# Patient Record
Sex: MALE | Race: WHITE | NOT HISPANIC OR LATINO
[De-identification: names, ages, dates, MRNs, and addresses within clinical notes are randomized per-mention and may not be internally consistent; named-entity substitution may affect disease eponyms.]

---

## 2017-02-15 ENCOUNTER — TRANSCRIPTION ENCOUNTER (OUTPATIENT)
Age: 52
End: 2017-02-15

## 2017-02-16 ENCOUNTER — APPOINTMENT (OUTPATIENT)
Dept: FAMILY MEDICINE | Facility: CLINIC | Age: 52
End: 2017-02-16

## 2017-02-16 VITALS
BODY MASS INDEX: 32.21 KG/M2 | DIASTOLIC BLOOD PRESSURE: 70 MMHG | HEIGHT: 70 IN | WEIGHT: 225 LBS | SYSTOLIC BLOOD PRESSURE: 110 MMHG | HEART RATE: 74 BPM

## 2017-02-16 DIAGNOSIS — F40.240 CLAUSTROPHOBIA: ICD-10-CM

## 2017-02-16 DIAGNOSIS — R10.9 UNSPECIFIED ABDOMINAL PAIN: ICD-10-CM

## 2017-03-16 ENCOUNTER — APPOINTMENT (OUTPATIENT)
Dept: FAMILY MEDICINE | Facility: CLINIC | Age: 52
End: 2017-03-16

## 2017-03-16 VITALS
TEMPERATURE: 98.5 F | HEART RATE: 99 BPM | BODY MASS INDEX: 32.21 KG/M2 | SYSTOLIC BLOOD PRESSURE: 110 MMHG | DIASTOLIC BLOOD PRESSURE: 78 MMHG | HEIGHT: 70 IN | WEIGHT: 225 LBS | OXYGEN SATURATION: 97 %

## 2017-07-10 ENCOUNTER — APPOINTMENT (OUTPATIENT)
Dept: FAMILY MEDICINE | Facility: CLINIC | Age: 52
End: 2017-07-10

## 2017-07-28 ENCOUNTER — APPOINTMENT (OUTPATIENT)
Dept: FAMILY MEDICINE | Facility: CLINIC | Age: 52
End: 2017-07-28
Payer: COMMERCIAL

## 2017-07-28 VITALS
BODY MASS INDEX: 32.21 KG/M2 | SYSTOLIC BLOOD PRESSURE: 112 MMHG | HEIGHT: 70 IN | WEIGHT: 225 LBS | TEMPERATURE: 98.6 F | DIASTOLIC BLOOD PRESSURE: 70 MMHG

## 2017-07-28 DIAGNOSIS — K05.10 CHRONIC GINGIVITIS, PLAQUE INDUCED: ICD-10-CM

## 2017-07-28 PROCEDURE — 99213 OFFICE O/P EST LOW 20 MIN: CPT

## 2017-07-29 PROBLEM — K05.10 GUM INFLAMMATION: Status: ACTIVE | Noted: 2017-07-29

## 2017-07-30 ENCOUNTER — TRANSCRIPTION ENCOUNTER (OUTPATIENT)
Age: 52
End: 2017-07-30

## 2017-10-20 ENCOUNTER — RX RENEWAL (OUTPATIENT)
Age: 52
End: 2017-10-20

## 2017-10-22 ENCOUNTER — TRANSCRIPTION ENCOUNTER (OUTPATIENT)
Age: 52
End: 2017-10-22

## 2017-10-24 ENCOUNTER — APPOINTMENT (OUTPATIENT)
Dept: FAMILY MEDICINE | Facility: CLINIC | Age: 52
End: 2017-10-24
Payer: COMMERCIAL

## 2017-10-24 VITALS
DIASTOLIC BLOOD PRESSURE: 80 MMHG | BODY MASS INDEX: 32.21 KG/M2 | TEMPERATURE: 98.7 F | WEIGHT: 225 LBS | HEIGHT: 70 IN | OXYGEN SATURATION: 97 % | SYSTOLIC BLOOD PRESSURE: 110 MMHG | HEART RATE: 119 BPM

## 2017-10-24 DIAGNOSIS — Z87.09 PERSONAL HISTORY OF OTHER DISEASES OF THE RESPIRATORY SYSTEM: ICD-10-CM

## 2017-10-24 PROCEDURE — 99214 OFFICE O/P EST MOD 30 MIN: CPT

## 2017-10-27 ENCOUNTER — APPOINTMENT (OUTPATIENT)
Dept: FAMILY MEDICINE | Facility: CLINIC | Age: 52
End: 2017-10-27
Payer: COMMERCIAL

## 2017-10-27 VITALS
DIASTOLIC BLOOD PRESSURE: 80 MMHG | TEMPERATURE: 98.5 F | BODY MASS INDEX: 32.21 KG/M2 | HEART RATE: 94 BPM | WEIGHT: 225 LBS | HEIGHT: 70 IN | SYSTOLIC BLOOD PRESSURE: 110 MMHG | OXYGEN SATURATION: 99 %

## 2017-10-27 DIAGNOSIS — Z87.09 PERSONAL HISTORY OF OTHER DISEASES OF THE RESPIRATORY SYSTEM: ICD-10-CM

## 2017-10-27 DIAGNOSIS — J31.0 CHRONIC RHINITIS: ICD-10-CM

## 2017-10-27 PROCEDURE — 99213 OFFICE O/P EST LOW 20 MIN: CPT

## 2017-10-27 RX ORDER — NYSTATIN 100000 [USP'U]/ML
100000 SUSPENSION ORAL
Qty: 480 | Refills: 0 | Status: DISCONTINUED | COMMUNITY
Start: 2017-07-30 | End: 2017-10-27

## 2017-10-27 RX ORDER — CLOTRIMAZOLE 10 MG/1
10 LOZENGE ORAL
Qty: 90 | Refills: 0 | Status: DISCONTINUED | COMMUNITY
Start: 2017-07-30 | End: 2017-10-27

## 2017-10-27 RX ORDER — AZITHROMYCIN 250 MG/1
250 TABLET, FILM COATED ORAL
Qty: 1 | Refills: 0 | Status: DISCONTINUED | COMMUNITY
Start: 2017-03-16 | End: 2017-10-27

## 2017-10-27 RX ORDER — SULFAMETHOXAZOLE AND TRIMETHOPRIM 800; 160 MG/1; MG/1
800-160 TABLET ORAL
Qty: 20 | Refills: 0 | Status: DISCONTINUED | COMMUNITY
Start: 2017-04-27 | End: 2017-10-27

## 2017-10-27 RX ORDER — METHYLPREDNISOLONE 4 MG/1
4 TABLET ORAL
Qty: 21 | Refills: 0 | Status: DISCONTINUED | COMMUNITY
Start: 2017-09-22 | End: 2017-10-27

## 2018-05-21 ENCOUNTER — APPOINTMENT (OUTPATIENT)
Dept: FAMILY MEDICINE | Facility: CLINIC | Age: 53
End: 2018-05-21
Payer: COMMERCIAL

## 2018-05-21 VITALS
BODY MASS INDEX: 31.55 KG/M2 | SYSTOLIC BLOOD PRESSURE: 128 MMHG | OXYGEN SATURATION: 98 % | HEIGHT: 70 IN | DIASTOLIC BLOOD PRESSURE: 82 MMHG | HEART RATE: 86 BPM | WEIGHT: 220.38 LBS

## 2018-05-21 DIAGNOSIS — M79.89 OTHER SPECIFIED SOFT TISSUE DISORDERS: ICD-10-CM

## 2018-05-21 DIAGNOSIS — Z87.2 PERSONAL HISTORY OF DISEASES OF THE SKIN AND SUBCUTANEOUS TISSUE: ICD-10-CM

## 2018-05-21 DIAGNOSIS — I83.90 ASYMPTOMATIC VARICOSE VEINS OF UNSPECIFIED LOWER EXTREMITY: ICD-10-CM

## 2018-05-21 DIAGNOSIS — I80.3 PHLEBITIS AND THROMBOPHLEBITIS OF LOWER EXTREMITIES, UNSPECIFIED: ICD-10-CM

## 2018-05-21 PROCEDURE — 99214 OFFICE O/P EST MOD 30 MIN: CPT

## 2018-05-21 RX ORDER — AZITHROMYCIN 250 MG/1
250 TABLET, FILM COATED ORAL
Qty: 1 | Refills: 0 | Status: COMPLETED | COMMUNITY
Start: 2017-10-24 | End: 2018-05-21

## 2018-05-21 RX ORDER — BENZONATATE 200 MG/1
200 CAPSULE ORAL
Qty: 30 | Refills: 0 | Status: COMPLETED | COMMUNITY
Start: 2017-10-27 | End: 2018-05-21

## 2018-05-21 RX ORDER — OMEPRAZOLE 40 MG/1
40 CAPSULE, DELAYED RELEASE ORAL
Qty: 30 | Refills: 0 | Status: COMPLETED | COMMUNITY
Start: 2017-02-16 | End: 2018-05-21

## 2018-05-21 RX ORDER — ALBUTEROL SULFATE 108 UG/1
108 (90 BASE) AEROSOL, METERED RESPIRATORY (INHALATION) EVERY 6 HOURS
Qty: 1 | Refills: 0 | Status: COMPLETED | COMMUNITY
Start: 2017-03-16 | End: 2018-05-21

## 2018-05-23 NOTE — ASSESSMENT
[FreeTextEntry1] : CAD,htn- cont meds. f/u cardio. check labs\par fatigue - check labs\par varicose veins, hx phlebitis - referral vascular as pt is considering vein ablation\par Screening labs ordered\par referral to derm for skin CA screen

## 2018-05-23 NOTE — HISTORY OF PRESENT ILLNESS
[de-identified] : Pt f/u CAD and htn. pt compliant w/ meds does not need refills at this time. Pt due for bloodwork.\par Pt needs referral to GI for f/u colonoscopy. Pt had polyps on last colonoscopy.\par Pt wants derm referral for skin check. Pt has not had screening labs done in a while. \par Pt has chronic fatigue issues.\par Pt has hx of phlebitis and varicose veins in leg, would like f/u with vascular.\par

## 2018-09-09 LAB
25(OH)D3 SERPL-MCNC: 28.7 NG/ML
ALBUMIN SERPL ELPH-MCNC: 4.2 G/DL
ALP BLD-CCNC: 60 U/L
ALT SERPL-CCNC: 28 U/L
ANION GAP SERPL CALC-SCNC: 14 MMOL/L
APPEARANCE: CLEAR
AST SERPL-CCNC: 29 U/L
BACTERIA: NEGATIVE
BASOPHILS # BLD AUTO: 0.06 K/UL
BASOPHILS NFR BLD AUTO: 1.1 %
BILIRUB SERPL-MCNC: 0.7 MG/DL
BILIRUBIN URINE: NEGATIVE
BLOOD URINE: NEGATIVE
BUN SERPL-MCNC: 10 MG/DL
CALCIUM SERPL-MCNC: 9.3 MG/DL
CHLORIDE SERPL-SCNC: 101 MMOL/L
CHOLEST SERPL-MCNC: 150 MG/DL
CHOLEST/HDLC SERPL: 3.1 RATIO
CO2 SERPL-SCNC: 26 MMOL/L
COLOR: YELLOW
CREAT SERPL-MCNC: 0.93 MG/DL
EOSINOPHIL # BLD AUTO: 0.36 K/UL
EOSINOPHIL NFR BLD AUTO: 6.6 %
GLUCOSE QUALITATIVE U: NEGATIVE MG/DL
GLUCOSE SERPL-MCNC: 81 MG/DL
HBA1C MFR BLD HPLC: 5.3 %
HCT VFR BLD CALC: 48.6 %
HDLC SERPL-MCNC: 49 MG/DL
HGB BLD-MCNC: 16 G/DL
IMM GRANULOCYTES NFR BLD AUTO: 0.2 %
KETONES URINE: NEGATIVE
LDLC SERPL CALC-MCNC: 93 MG/DL
LEUKOCYTE ESTERASE URINE: NEGATIVE
LYMPHOCYTES # BLD AUTO: 1.5 K/UL
LYMPHOCYTES NFR BLD AUTO: 27.7 %
MAN DIFF?: NORMAL
MCHC RBC-ENTMCNC: 31.6 PG
MCHC RBC-ENTMCNC: 32.9 GM/DL
MCV RBC AUTO: 96 FL
MICROSCOPIC-UA: NORMAL
MONOCYTES # BLD AUTO: 0.54 K/UL
MONOCYTES NFR BLD AUTO: 10 %
NEUTROPHILS # BLD AUTO: 2.95 K/UL
NEUTROPHILS NFR BLD AUTO: 54.4 %
NITRITE URINE: NEGATIVE
PH URINE: 7.5
PLATELET # BLD AUTO: 314 K/UL
POTASSIUM SERPL-SCNC: 3.8 MMOL/L
PROT SERPL-MCNC: 7.1 G/DL
PROTEIN URINE: ABNORMAL MG/DL
PSA SERPL-MCNC: 1.68 NG/ML
RBC # BLD: 5.06 M/UL
RBC # FLD: 13.4 %
RED BLOOD CELLS URINE: 2 /HPF
SODIUM SERPL-SCNC: 141 MMOL/L
SPECIFIC GRAVITY URINE: 1.02
SQUAMOUS EPITHELIAL CELLS: 0 /HPF
T4 FREE SERPL-MCNC: 1.2 NG/DL
TESTOST SERPL-MCNC: 485.1 NG/DL
TRIGL SERPL-MCNC: 42 MG/DL
TSH SERPL-ACNC: 1.88 UIU/ML
UROBILINOGEN URINE: NEGATIVE MG/DL
WBC # FLD AUTO: 5.42 K/UL
WHITE BLOOD CELLS URINE: 0 /HPF

## 2018-10-29 ENCOUNTER — RX RENEWAL (OUTPATIENT)
Age: 53
End: 2018-10-29

## 2018-11-06 ENCOUNTER — APPOINTMENT (OUTPATIENT)
Dept: FAMILY MEDICINE | Facility: CLINIC | Age: 53
End: 2018-11-06
Payer: COMMERCIAL

## 2018-11-06 VITALS
HEART RATE: 70 BPM | SYSTOLIC BLOOD PRESSURE: 120 MMHG | DIASTOLIC BLOOD PRESSURE: 70 MMHG | RESPIRATION RATE: 16 BRPM | WEIGHT: 238 LBS | OXYGEN SATURATION: 98 % | HEIGHT: 70 IN | BODY MASS INDEX: 34.07 KG/M2

## 2018-11-06 PROCEDURE — G0008: CPT

## 2018-11-06 PROCEDURE — 99214 OFFICE O/P EST MOD 30 MIN: CPT | Mod: 25

## 2018-11-06 PROCEDURE — 90686 IIV4 VACC NO PRSV 0.5 ML IM: CPT

## 2018-11-06 RX ORDER — BUDESONIDE AND FORMOTEROL FUMARATE DIHYDRATE 80; 4.5 UG/1; UG/1
80-4.5 AEROSOL RESPIRATORY (INHALATION) TWICE DAILY
Qty: 1 | Refills: 6 | Status: DISCONTINUED | COMMUNITY
Start: 2017-10-27 | End: 2018-11-06

## 2018-11-06 RX ORDER — DIAZEPAM 10 MG/1
10 TABLET ORAL
Qty: 10 | Refills: 0 | Status: DISCONTINUED | COMMUNITY
Start: 2017-09-18 | End: 2018-11-06

## 2018-11-06 RX ORDER — TRAMADOL HYDROCHLORIDE 50 MG/1
50 TABLET, COATED ORAL
Qty: 60 | Refills: 0 | Status: DISCONTINUED | COMMUNITY
Start: 2017-09-26 | End: 2018-11-06

## 2018-11-06 RX ORDER — AMOXICILLIN AND CLAVULANATE POTASSIUM 875; 125 MG/1; MG/1
875-125 TABLET, COATED ORAL
Qty: 20 | Refills: 0 | Status: COMPLETED | COMMUNITY
Start: 2017-10-22 | End: 2018-11-06

## 2018-11-06 NOTE — ASSESSMENT
[FreeTextEntry1] : htn- controlled, refill hctz, lisinopril\par obesity - Advised lifestyle modifications for wt loss. Healthy diet and regular exercise regimen discussed w/ pt. cont weight watchers\par insomnia - start melatonin, avoid ETOH at night. if no improvement pt may need medication\par Risks and benefits of flu vaccine discussed w/ pt. Consent given by pt, flu vaccine administered. Pt tolerated well

## 2018-11-06 NOTE — HISTORY OF PRESENT ILLNESS
[de-identified] : Pt for f/u htn, insomnia, obesity. Pt has restarted weight watchers. Pt has poor sleep nightly. Pt has DARREN, uses CPAP nightly. Pt only able to stay asleep approx 4-5 hrs. Pt sometimes uses advil PM which helps slightly. Pt does state he drinks 1 ETOH drink a night.

## 2019-01-05 ENCOUNTER — APPOINTMENT (OUTPATIENT)
Dept: FAMILY MEDICINE | Facility: CLINIC | Age: 54
End: 2019-01-05
Payer: COMMERCIAL

## 2019-01-05 VITALS
SYSTOLIC BLOOD PRESSURE: 114 MMHG | TEMPERATURE: 98.1 F | WEIGHT: 220 LBS | DIASTOLIC BLOOD PRESSURE: 80 MMHG | BODY MASS INDEX: 31.5 KG/M2 | OXYGEN SATURATION: 97 % | HEIGHT: 70 IN | HEART RATE: 73 BPM

## 2019-01-05 DIAGNOSIS — R10.30 LOWER ABDOMINAL PAIN, UNSPECIFIED: ICD-10-CM

## 2019-01-05 LAB
BILIRUB UR QL STRIP: NEGATIVE
CLARITY UR: CLEAR
COLLECTION METHOD: NORMAL
GLUCOSE UR-MCNC: NEGATIVE
HCG UR QL: 0.2 EU/DL
HGB UR QL STRIP.AUTO: NEGATIVE
KETONES UR-MCNC: NORMAL
LEUKOCYTE ESTERASE UR QL STRIP: NEGATIVE
NITRITE UR QL STRIP: NEGATIVE
PH UR STRIP: 5.5
PROT UR STRIP-MCNC: NEGATIVE
SP GR UR STRIP: 1.02

## 2019-01-05 PROCEDURE — 99213 OFFICE O/P EST LOW 20 MIN: CPT | Mod: 25

## 2019-01-05 PROCEDURE — 81003 URINALYSIS AUTO W/O SCOPE: CPT | Mod: QW

## 2019-01-05 NOTE — ASSESSMENT
[FreeTextEntry1] : lower abdominal pain - check urine cx. advised wearing looser pants. if pain worsens then may order abd ct

## 2019-01-05 NOTE — HISTORY OF PRESENT ILLNESS
[FreeTextEntry8] : Pt c/o lower back spasms chronically. Lately pain has radiated from R flank into lower abdomen and groin. However, when pt urinates in AM pain decreases significantly. Denies dysuria, fever, chills.

## 2019-01-08 LAB — BACTERIA UR CULT: NORMAL

## 2019-02-01 ENCOUNTER — RX RENEWAL (OUTPATIENT)
Age: 54
End: 2019-02-01

## 2019-03-11 ENCOUNTER — RESULT REVIEW (OUTPATIENT)
Age: 54
End: 2019-03-11

## 2019-09-20 ENCOUNTER — TRANSCRIPTION ENCOUNTER (OUTPATIENT)
Age: 54
End: 2019-09-20

## 2019-11-19 ENCOUNTER — RX RENEWAL (OUTPATIENT)
Age: 54
End: 2019-11-19

## 2019-11-22 ENCOUNTER — RX RENEWAL (OUTPATIENT)
Age: 54
End: 2019-11-22

## 2019-12-03 ENCOUNTER — APPOINTMENT (OUTPATIENT)
Dept: FAMILY MEDICINE | Facility: CLINIC | Age: 54
End: 2019-12-03
Payer: COMMERCIAL

## 2019-12-03 ENCOUNTER — LABORATORY RESULT (OUTPATIENT)
Age: 54
End: 2019-12-03

## 2019-12-03 VITALS
HEART RATE: 84 BPM | HEIGHT: 70 IN | WEIGHT: 225 LBS | DIASTOLIC BLOOD PRESSURE: 90 MMHG | BODY MASS INDEX: 32.21 KG/M2 | RESPIRATION RATE: 16 BRPM | SYSTOLIC BLOOD PRESSURE: 118 MMHG | OXYGEN SATURATION: 95 %

## 2019-12-03 VITALS — DIASTOLIC BLOOD PRESSURE: 86 MMHG | SYSTOLIC BLOOD PRESSURE: 126 MMHG

## 2019-12-03 DIAGNOSIS — I25.10 ATHEROSCLEROTIC HEART DISEASE OF NATIVE CORONARY ARTERY W/OUT ANGINA PECTORIS: ICD-10-CM

## 2019-12-03 DIAGNOSIS — Z91.018 ALLERGY TO OTHER FOODS: ICD-10-CM

## 2019-12-03 DIAGNOSIS — J98.01 ACUTE BRONCHOSPASM: ICD-10-CM

## 2019-12-03 PROCEDURE — 90686 IIV4 VACC NO PRSV 0.5 ML IM: CPT

## 2019-12-03 PROCEDURE — 99214 OFFICE O/P EST MOD 30 MIN: CPT | Mod: 25

## 2019-12-03 PROCEDURE — G0008: CPT

## 2019-12-03 NOTE — HISTORY OF PRESENT ILLNESS
[de-identified] : Pt for f/u htn, insomnia, obesity. Pt has inconsistent diet. Pt has poor sleep nightly. Pt has DARREN, uses CPAP nightly. Pt only able to stay asleep approx 4-5 hrs. Pt sometimes uses advil PM which helps slightly. Pt does state he drinks 1 ETOH drink a night.\par Pt was found to have elevated , pt not c/o myalgias. Pt does heavy physical work daily as a .

## 2019-12-03 NOTE — ASSESSMENT
[FreeTextEntry1] : CAD, htn - refill lisinopril, hctz. check cmp\par elevated CK- recheck CK level\par obesity - Advised lifestyle modifications for wt loss. Healthy diet and regular exercise regimen discussed w/ pt. check hba1c\par possibly food allergy- check food allergy panel\par bronchospasm - refill ventolin prn

## 2019-12-09 LAB
ALBUMIN SERPL ELPH-MCNC: 4.4 G/DL
ALP BLD-CCNC: 63 U/L
ALT SERPL-CCNC: 29 U/L
ANION GAP SERPL CALC-SCNC: 13 MMOL/L
AST SERPL-CCNC: 25 U/L
BARLEY IGE QN: <0.1 KUA/L
BILIRUB SERPL-MCNC: 0.6 MG/DL
BUN SERPL-MCNC: 12 MG/DL
CALCIUM SERPL-MCNC: 9.7 MG/DL
CHERRY IGE QN: <0.1 KUA/L
CHLORIDE SERPL-SCNC: 102 MMOL/L
CK SERPL-CCNC: 417 U/L
CO2 SERPL-SCNC: 25 MMOL/L
COW MILK IGE QN: <0.1 KUA/L
CRAB IGE QN: <0.1 KUA/L
CREAT SERPL-MCNC: 0.94 MG/DL
DEPRECATED BARLEY IGE RAST QL: 0
DEPRECATED CHERRY IGE RAST QL: 0
DEPRECATED COW MILK IGE RAST QL: 0
DEPRECATED CRAB IGE RAST QL: 0
DEPRECATED EGG WHITE IGE RAST QL: 0
DEPRECATED OAT IGE RAST QL: 0
DEPRECATED PEANUT IGE RAST QL: 0
DEPRECATED RYE IGE RAST QL: 0
DEPRECATED SOYBEAN IGE RAST QL: 0
DEPRECATED WHEAT IGE RAST QL: 0
EGG WHITE IGE QN: <0.1 KUA/L
ESTIMATED AVERAGE GLUCOSE: 105 MG/DL
GLUCOSE SERPL-MCNC: 93 MG/DL
HBA1C MFR BLD HPLC: 5.3 %
OAT IGE QN: <0.1 KUA/L
PEANUT IGE QN: <0.1 KUA/L
POTASSIUM SERPL-SCNC: 4.1 MMOL/L
PROT SERPL-MCNC: 6.8 G/DL
RYE IGE QN: <0.1 KUA/L
SODIUM SERPL-SCNC: 140 MMOL/L
SOYBEAN IGE QN: <0.1 KUA/L
TOTAL IGE SMQN RAST: 77 KU/L
WHEAT IGE QN: <0.1 KUA/L

## 2020-05-05 ENCOUNTER — APPOINTMENT (OUTPATIENT)
Dept: FAMILY MEDICINE | Facility: CLINIC | Age: 55
End: 2020-05-05
Payer: OTHER MISCELLANEOUS

## 2020-05-05 VITALS
SYSTOLIC BLOOD PRESSURE: 120 MMHG | DIASTOLIC BLOOD PRESSURE: 72 MMHG | WEIGHT: 209 LBS | OXYGEN SATURATION: 98 % | RESPIRATION RATE: 16 BRPM | HEART RATE: 82 BPM | BODY MASS INDEX: 29.92 KG/M2 | HEIGHT: 70 IN

## 2020-05-05 DIAGNOSIS — K42.9 UMBILICAL HERNIA W/OUT OBSTRUCTION OR GANGRENE: ICD-10-CM

## 2020-05-05 PROCEDURE — 99203 OFFICE O/P NEW LOW 30 MIN: CPT

## 2020-05-05 NOTE — PHYSICAL EXAM
[Normal] : normal rate, regular rhythm, normal S1 and S2 and no murmur heard [Soft] : abdomen soft [Non Tender] : non-tender [Non-distended] : non-distended [No HSM] : no HSM [Normal Bowel Sounds] : normal bowel sounds [de-identified] : +umbilical hernia, reducible

## 2020-05-05 NOTE — ASSESSMENT
[FreeTextEntry1] : umbilical hernia - pt may continue to work but should wear abdominal band to help dec intraabdominal pressure. Pt should limit any lifting >30 lbs. Referred pt to gen surg for repair

## 2020-05-05 NOTE — HISTORY OF PRESENT ILLNESS
[FreeTextEntry8] : Pt in office for new w/c case. DOI 4/27/20. Pt was lifting a water heater at work and felt sharp pain in his abdomen. Pt then noticed he had outpouching around his umbilicus. Pt then saw more outpouching and felt pain when lifting any heavy objects.

## 2020-05-07 ENCOUNTER — TRANSCRIPTION ENCOUNTER (OUTPATIENT)
Age: 55
End: 2020-05-07

## 2020-09-17 ENCOUNTER — LABORATORY RESULT (OUTPATIENT)
Age: 55
End: 2020-09-17

## 2020-09-17 ENCOUNTER — APPOINTMENT (OUTPATIENT)
Dept: FAMILY MEDICINE | Facility: CLINIC | Age: 55
End: 2020-09-17
Payer: COMMERCIAL

## 2020-09-17 VITALS
HEIGHT: 70 IN | WEIGHT: 216 LBS | HEART RATE: 89 BPM | OXYGEN SATURATION: 98 % | SYSTOLIC BLOOD PRESSURE: 124 MMHG | DIASTOLIC BLOOD PRESSURE: 80 MMHG | BODY MASS INDEX: 30.92 KG/M2

## 2020-09-17 DIAGNOSIS — L30.9 DERMATITIS, UNSPECIFIED: ICD-10-CM

## 2020-09-17 PROCEDURE — 36415 COLL VENOUS BLD VENIPUNCTURE: CPT

## 2020-09-17 PROCEDURE — 99214 OFFICE O/P EST MOD 30 MIN: CPT | Mod: 25

## 2020-09-19 NOTE — HISTORY OF PRESENT ILLNESS
[FreeTextEntry8] : Pt c/o small rash that pops up on arms and lower legs. These lesions usually pop up 1-2 x a year. Most recently pt has had these occur daily. Topical steroids have not helped. Pt unsure if these are triggered by certain foods. Denies new lotions/soaps.\par Pt also f/u obesity, htn, elev CK. Pt due tor echeck labs.

## 2020-09-21 DIAGNOSIS — Z86.2 PERSONAL HISTORY OF DISEASES OF THE BLOOD AND BLOOD-FORMING ORGANS AND CERTAIN DISORDERS INVOLVING THE IMMUNE MECHANISM: ICD-10-CM

## 2020-09-21 LAB
ALBUMIN SERPL ELPH-MCNC: 4.8 G/DL
ALP BLD-CCNC: 62 U/L
ALT SERPL-CCNC: 26 U/L
ANION GAP SERPL CALC-SCNC: 14 MMOL/L
APPLE IGE QN: <0.1 KUA/L
AST SERPL-CCNC: 24 U/L
BANANA IGE QN: 0.17 KUA/L
BARLEY IGE QN: <0.1 KUA/L
BASOPHILS # BLD AUTO: 0.1 K/UL
BASOPHILS NFR BLD AUTO: 1.4 %
BILIRUB SERPL-MCNC: 0.3 MG/DL
BUN SERPL-MCNC: 16 MG/DL
CALCIUM SERPL-MCNC: 9.5 MG/DL
CHERRY IGE QN: <0.1 KUA/L
CHLORIDE SERPL-SCNC: 102 MMOL/L
CO2 SERPL-SCNC: 26 MMOL/L
COCONUT IGE QN: 0
COCONUT IGE QN: <0.1 KUA/L
COW MILK IGE QN: <0.1 KUA/L
CRAB IGE QN: <0.1 KUA/L
CREAT SERPL-MCNC: 1.08 MG/DL
DEPRECATED APPLE IGE RAST QL: 0
DEPRECATED BANANA IGE RAST QL: NORMAL
DEPRECATED BARLEY IGE RAST QL: 0
DEPRECATED CHERRY IGE RAST QL: 0
DEPRECATED COW MILK IGE RAST QL: 0
DEPRECATED CRAB IGE RAST QL: 0
DEPRECATED EGG WHITE IGE RAST QL: 0
DEPRECATED KIWIFRUIT IGE RAST QL: <0.1 KUA/L
DEPRECATED MELON IGE RAST QL: 0
DEPRECATED OAT IGE RAST QL: 0
DEPRECATED ORANGE IGE RAST QL: 0
DEPRECATED PEANUT IGE RAST QL: 0
DEPRECATED RYE IGE RAST QL: 0
DEPRECATED SOYBEAN IGE RAST QL: 0
DEPRECATED STRAWBERRY IGE RAST QL: 0
DEPRECATED WHEAT IGE RAST QL: 0
EGG WHITE IGE QN: <0.1 KUA/L
EOSINOPHIL # BLD AUTO: 0.54 K/UL
EOSINOPHIL NFR BLD AUTO: 7.8 %
ESTIMATED AVERAGE GLUCOSE: 103 MG/DL
GLUCOSE SERPL-MCNC: 94 MG/DL
HBA1C MFR BLD HPLC: 5.2 %
HCT VFR BLD CALC: 48.2 %
HGB BLD-MCNC: 16 G/DL
IMM GRANULOCYTES NFR BLD AUTO: 0.1 %
KIWIFRUIT IGE QN: 0
LYMPHOCYTES # BLD AUTO: 2.09 K/UL
LYMPHOCYTES NFR BLD AUTO: 30.2 %
MAN DIFF?: NORMAL
MCHC RBC-ENTMCNC: 31.6 PG
MCHC RBC-ENTMCNC: 33.2 GM/DL
MCV RBC AUTO: 95.1 FL
MELON IGE QN: <0.1 KUA/L
MONOCYTES # BLD AUTO: 0.59 K/UL
MONOCYTES NFR BLD AUTO: 8.5 %
NEUTROPHILS # BLD AUTO: 3.58 K/UL
NEUTROPHILS NFR BLD AUTO: 52 %
OAT IGE QN: <0.1 KUA/L
ORANGE IGE QN: <0.1 KUA/L
PEANUT IGE QN: <0.1 KUA/L
PLATELET # BLD AUTO: 341 K/UL
POTASSIUM SERPL-SCNC: 4.5 MMOL/L
PROT SERPL-MCNC: 6.9 G/DL
PSA SERPL-MCNC: 1.76 NG/ML
RBC # BLD: 5.07 M/UL
RBC # FLD: 12.8 %
RYE IGE QN: <0.1 KUA/L
SODIUM SERPL-SCNC: 142 MMOL/L
SOYBEAN IGE QN: <0.1 KUA/L
STRAWBERRY IGE QN: <0.1 KUA/L
T4 FREE SERPL-MCNC: 1.2 NG/DL
TOTAL IGE SMQN RAST: 65 KU/L
TSH SERPL-ACNC: 1.38 UIU/ML
WBC # FLD AUTO: 6.91 K/UL
WHEAT IGE QN: <0.1 KUA/L

## 2020-12-15 PROBLEM — Z87.09 HISTORY OF ACUTE SINUSITIS: Status: RESOLVED | Noted: 2017-10-24 | Resolved: 2020-12-15

## 2020-12-15 PROBLEM — Z87.09 HISTORY OF ACUTE BRONCHITIS: Status: RESOLVED | Noted: 2017-03-16 | Resolved: 2020-12-15

## 2021-01-27 DIAGNOSIS — B07.0 PLANTAR WART: ICD-10-CM

## 2021-03-20 ENCOUNTER — TRANSCRIPTION ENCOUNTER (OUTPATIENT)
Age: 56
End: 2021-03-20

## 2021-05-20 ENCOUNTER — APPOINTMENT (OUTPATIENT)
Dept: FAMILY MEDICINE | Facility: CLINIC | Age: 56
End: 2021-05-20
Payer: COMMERCIAL

## 2021-05-20 VITALS
HEART RATE: 73 BPM | DIASTOLIC BLOOD PRESSURE: 80 MMHG | SYSTOLIC BLOOD PRESSURE: 110 MMHG | BODY MASS INDEX: 32.21 KG/M2 | OXYGEN SATURATION: 96 % | WEIGHT: 225 LBS | TEMPERATURE: 97.1 F | HEIGHT: 70 IN

## 2021-05-20 DIAGNOSIS — G89.29 LOW BACK PAIN: ICD-10-CM

## 2021-05-20 DIAGNOSIS — M54.5 LOW BACK PAIN: ICD-10-CM

## 2021-05-20 DIAGNOSIS — M25.561 PAIN IN RIGHT KNEE: ICD-10-CM

## 2021-05-20 DIAGNOSIS — Z71.89 OTHER SPECIFIED COUNSELING: ICD-10-CM

## 2021-05-20 DIAGNOSIS — R53.83 OTHER FATIGUE: ICD-10-CM

## 2021-05-20 DIAGNOSIS — M25.562 PAIN IN RIGHT KNEE: ICD-10-CM

## 2021-05-20 PROCEDURE — 99215 OFFICE O/P EST HI 40 MIN: CPT | Mod: 25

## 2021-05-20 PROCEDURE — 96127 BRIEF EMOTIONAL/BEHAV ASSMT: CPT

## 2021-05-21 PROBLEM — Z71.89 VACCINE COUNSELING: Status: ACTIVE | Noted: 2021-05-21

## 2021-05-21 NOTE — HISTORY OF PRESENT ILLNESS
[de-identified] : Pt to have signficiant environmental allergies. Pt seeing allergist. Pt using nasocort and allegra.\par Pt s/p COVID infection in 03/2021. Pt had antibody infusion 3/8/21. Pt had initially residual dyspnea which has resolved after 1 month. Pt has questions regarding when he should get vaccine.\par  \par Pt has pain that radiates down R leg when sitting for extended periods when driving. Pt also feels vibratory sensation down L thigh. Pt c/o occasional burning sensation on bottom of b/l feet R>L, last episode 1 month ago. \par Pt has chronic L knee pain for several months. Pt does kneel often for his work\par Pt also c/o chronic fatigue. Pt admits to sleeping <6 hrs of sleep/night.\par Pt also f/u htn, due to refill meds. Denies CP, palpitations, dyspnea, n/v

## 2021-05-21 NOTE — ASSESSMENT
[FreeTextEntry1] : chronic low back pain, b/l knee pain - nsaids prn. modify activity if possible\par fatigue - Check labs. advised increase in sleep as <6 hrs/night is likely contributing to chronic fatigue. avoid caffeine in PM hrs\par htn- reifll meds\par Check labs\par vaccine counseling - advised pt he may get COVID vaccine 3 mos after administration of monoclonal antibodies

## 2021-05-21 NOTE — PHYSICAL EXAM
[Normal] : normal rate, regular rhythm, normal S1 and S2 and no murmur heard [Soft] : abdomen soft [Non Tender] : non-tender [de-identified] : b/l knee no joint space tenderness, neg smith testing

## 2021-05-27 LAB
ALBUMIN SERPL ELPH-MCNC: 4.7 G/DL
ALP BLD-CCNC: 66 U/L
ALT SERPL-CCNC: 32 U/L
ANION GAP SERPL CALC-SCNC: 13 MMOL/L
APPEARANCE: CLEAR
AST SERPL-CCNC: 30 U/L
BACTERIA: NEGATIVE
BASOPHILS # BLD AUTO: 0.08 K/UL
BASOPHILS NFR BLD AUTO: 1.3 %
BILIRUB SERPL-MCNC: 0.6 MG/DL
BILIRUBIN URINE: NEGATIVE
BLOOD URINE: NEGATIVE
BUN SERPL-MCNC: 13 MG/DL
CALCIUM SERPL-MCNC: 9.7 MG/DL
CHLORIDE SERPL-SCNC: 102 MMOL/L
CHOLEST SERPL-MCNC: 163 MG/DL
CO2 SERPL-SCNC: 25 MMOL/L
COLOR: YELLOW
CREAT SERPL-MCNC: 0.92 MG/DL
EOSINOPHIL # BLD AUTO: 0.48 K/UL
EOSINOPHIL NFR BLD AUTO: 7.6 %
ESTIMATED AVERAGE GLUCOSE: 105 MG/DL
FOLATE SERPL-MCNC: 18.1 NG/ML
GLUCOSE QUALITATIVE U: NEGATIVE
GLUCOSE SERPL-MCNC: 89 MG/DL
HBA1C MFR BLD HPLC: 5.3 %
HCT VFR BLD CALC: 52.1 %
HDLC SERPL-MCNC: 50 MG/DL
HGB BLD-MCNC: 17.2 G/DL
HYALINE CASTS: 0 /LPF
IMM GRANULOCYTES NFR BLD AUTO: 0.3 %
KETONES URINE: NEGATIVE
LDLC SERPL CALC-MCNC: 101 MG/DL
LEUKOCYTE ESTERASE URINE: NEGATIVE
LYMPHOCYTES # BLD AUTO: 1.79 K/UL
LYMPHOCYTES NFR BLD AUTO: 28.3 %
MAN DIFF?: NORMAL
MCHC RBC-ENTMCNC: 31.3 PG
MCHC RBC-ENTMCNC: 33 GM/DL
MCV RBC AUTO: 94.9 FL
MICROSCOPIC-UA: NORMAL
MONOCYTES # BLD AUTO: 0.78 K/UL
MONOCYTES NFR BLD AUTO: 12.3 %
NEUTROPHILS # BLD AUTO: 3.18 K/UL
NEUTROPHILS NFR BLD AUTO: 50.2 %
NITRITE URINE: NEGATIVE
NONHDLC SERPL-MCNC: 113 MG/DL
PH URINE: 7
PLATELET # BLD AUTO: 334 K/UL
POTASSIUM SERPL-SCNC: 4.4 MMOL/L
PROT SERPL-MCNC: 7 G/DL
PROTEIN URINE: NORMAL
PSA SERPL-MCNC: 1.87 NG/ML
RBC # BLD: 5.49 M/UL
RBC # FLD: 12.8 %
RED BLOOD CELLS URINE: 1 /HPF
SODIUM SERPL-SCNC: 140 MMOL/L
SPECIFIC GRAVITY URINE: 1.03
SQUAMOUS EPITHELIAL CELLS: 0 /HPF
T4 FREE SERPL-MCNC: 1.3 NG/DL
TRIGL SERPL-MCNC: 60 MG/DL
TSH SERPL-ACNC: 1.31 UIU/ML
UROBILINOGEN URINE: NORMAL
VIT B12 SERPL-MCNC: 445 PG/ML
WBC # FLD AUTO: 6.33 K/UL
WHITE BLOOD CELLS URINE: 0 /HPF

## 2021-07-26 ENCOUNTER — TRANSCRIPTION ENCOUNTER (OUTPATIENT)
Age: 56
End: 2021-07-26

## 2022-01-03 ENCOUNTER — RX RENEWAL (OUTPATIENT)
Age: 57
End: 2022-01-03

## 2022-02-01 ENCOUNTER — LABORATORY RESULT (OUTPATIENT)
Age: 57
End: 2022-02-01

## 2022-02-01 ENCOUNTER — APPOINTMENT (OUTPATIENT)
Dept: FAMILY MEDICINE | Facility: CLINIC | Age: 57
End: 2022-02-01
Payer: COMMERCIAL

## 2022-02-01 VITALS
WEIGHT: 228 LBS | HEIGHT: 70 IN | TEMPERATURE: 97.8 F | SYSTOLIC BLOOD PRESSURE: 128 MMHG | DIASTOLIC BLOOD PRESSURE: 80 MMHG | HEART RATE: 89 BPM | OXYGEN SATURATION: 97 % | BODY MASS INDEX: 32.64 KG/M2

## 2022-02-01 PROCEDURE — G0444 DEPRESSION SCREEN ANNUAL: CPT | Mod: 59

## 2022-02-01 PROCEDURE — 99213 OFFICE O/P EST LOW 20 MIN: CPT | Mod: 25

## 2022-02-01 NOTE — HISTORY OF PRESENT ILLNESS
[FreeTextEntry1] : folow up [de-identified] : folow up on htn controlled on current medication has apt with cardiology this week requesting labs denies chest pain palpitations or dyspnea

## 2022-02-02 LAB
25(OH)D3 SERPL-MCNC: 29.9 NG/ML
ALBUMIN SERPL ELPH-MCNC: 4.5 G/DL
ALP BLD-CCNC: 65 U/L
ALT SERPL-CCNC: 28 U/L
ANION GAP SERPL CALC-SCNC: 15 MMOL/L
AST SERPL-CCNC: 30 U/L
BASOPHILS # BLD AUTO: 0.05 K/UL
BASOPHILS NFR BLD AUTO: 1 %
BILIRUB SERPL-MCNC: 0.7 MG/DL
BUN SERPL-MCNC: 14 MG/DL
CALCIUM SERPL-MCNC: 9.4 MG/DL
CHLORIDE SERPL-SCNC: 103 MMOL/L
CHOLEST SERPL-MCNC: 147 MG/DL
CK SERPL-CCNC: 638 U/L
CO2 SERPL-SCNC: 22 MMOL/L
CREAT SERPL-MCNC: 0.97 MG/DL
EOSINOPHIL # BLD AUTO: 0.32 K/UL
EOSINOPHIL NFR BLD AUTO: 6.2 %
ESTIMATED AVERAGE GLUCOSE: 105 MG/DL
GLUCOSE SERPL-MCNC: 97 MG/DL
HBA1C MFR BLD HPLC: 5.3 %
HCT VFR BLD CALC: 48.2 %
HDLC SERPL-MCNC: 49 MG/DL
HGB BLD-MCNC: 16.9 G/DL
IMM GRANULOCYTES NFR BLD AUTO: 0.2 %
LDLC SERPL CALC-MCNC: 87 MG/DL
LYMPHOCYTES # BLD AUTO: 1.29 K/UL
LYMPHOCYTES NFR BLD AUTO: 25 %
MAN DIFF?: NORMAL
MCHC RBC-ENTMCNC: 32 PG
MCHC RBC-ENTMCNC: 35.1 GM/DL
MCV RBC AUTO: 91.3 FL
MONOCYTES # BLD AUTO: 0.49 K/UL
MONOCYTES NFR BLD AUTO: 9.5 %
NEUTROPHILS # BLD AUTO: 3 K/UL
NEUTROPHILS NFR BLD AUTO: 58.1 %
NONHDLC SERPL-MCNC: 98 MG/DL
PLATELET # BLD AUTO: 297 K/UL
POTASSIUM SERPL-SCNC: 4.2 MMOL/L
PROT SERPL-MCNC: 7 G/DL
PSA SERPL-MCNC: 2.54 NG/ML
RBC # BLD: 5.28 M/UL
RBC # FLD: 12.2 %
SODIUM SERPL-SCNC: 140 MMOL/L
T4 SERPL-MCNC: 8.4 UG/DL
TRIGL SERPL-MCNC: 53 MG/DL
TSH SERPL-ACNC: 1.48 UIU/ML
URATE SERPL-MCNC: 6.6 MG/DL
VIT B12 SERPL-MCNC: 424 PG/ML
WBC # FLD AUTO: 5.16 K/UL

## 2022-02-08 ENCOUNTER — TRANSCRIPTION ENCOUNTER (OUTPATIENT)
Age: 57
End: 2022-02-08

## 2022-03-11 ENCOUNTER — TRANSCRIPTION ENCOUNTER (OUTPATIENT)
Age: 57
End: 2022-03-11

## 2022-05-24 ENCOUNTER — NON-APPOINTMENT (OUTPATIENT)
Age: 57
End: 2022-05-24

## 2022-05-24 ENCOUNTER — APPOINTMENT (OUTPATIENT)
Dept: RHEUMATOLOGY | Facility: CLINIC | Age: 57
End: 2022-05-24
Payer: COMMERCIAL

## 2022-05-24 VITALS
TEMPERATURE: 97 F | HEIGHT: 70 IN | OXYGEN SATURATION: 99 % | HEART RATE: 70 BPM | DIASTOLIC BLOOD PRESSURE: 70 MMHG | BODY MASS INDEX: 32.5 KG/M2 | SYSTOLIC BLOOD PRESSURE: 110 MMHG | WEIGHT: 227 LBS

## 2022-05-24 DIAGNOSIS — Z82.61 FAMILY HISTORY OF ARTHRITIS: ICD-10-CM

## 2022-05-24 DIAGNOSIS — Z87.39 PERSONAL HISTORY OF OTHER DISEASES OF THE MUSCULOSKELETAL SYSTEM AND CONNECTIVE TISSUE: ICD-10-CM

## 2022-05-24 DIAGNOSIS — M79.673 PAIN IN UNSPECIFIED FOOT: ICD-10-CM

## 2022-05-24 PROCEDURE — 99204 OFFICE O/P NEW MOD 45 MIN: CPT

## 2022-05-24 RX ORDER — FLUTICASONE PROPIONATE 50 UG/1
50 SPRAY, METERED NASAL DAILY
Qty: 1 | Refills: 11 | Status: DISCONTINUED | COMMUNITY
Start: 2017-10-27 | End: 2022-05-24

## 2022-05-24 RX ORDER — ASPIRIN 81 MG
81 TABLET, DELAYED RELEASE (ENTERIC COATED) ORAL
Refills: 0 | Status: ACTIVE | COMMUNITY

## 2022-06-02 ENCOUNTER — LABORATORY RESULT (OUTPATIENT)
Age: 57
End: 2022-06-02

## 2022-06-03 LAB
ALBUMIN SERPL ELPH-MCNC: 4.7 G/DL
ALDOLASE SERPL-CCNC: 6.9 U/L
ALP BLD-CCNC: 60 U/L
ALT SERPL-CCNC: 29 U/L
ANA PAT FLD IF-IMP: NORMAL
ANA SER IF-ACNC: ABNORMAL
ANION GAP SERPL CALC-SCNC: 12 MMOL/L
APPEARANCE: CLEAR
AST SERPL-CCNC: 25 U/L
BASOPHILS # BLD AUTO: 0.07 K/UL
BASOPHILS NFR BLD AUTO: 1.3 %
BILIRUB SERPL-MCNC: 0.7 MG/DL
BILIRUBIN URINE: NEGATIVE
BLOOD URINE: NEGATIVE
BUN SERPL-MCNC: 13 MG/DL
C3 SERPL-MCNC: 93 MG/DL
C4 SERPL-MCNC: 24 MG/DL
CALCIUM SERPL-MCNC: 9.6 MG/DL
CHLORIDE SERPL-SCNC: 103 MMOL/L
CK SERPL-CCNC: 425 U/L
CO2 SERPL-SCNC: 25 MMOL/L
COLOR: COLORLESS
CREAT SERPL-MCNC: 0.94 MG/DL
CRP SERPL-MCNC: <3 MG/L
EGFR: 95 ML/MIN/1.73M2
EOSINOPHIL # BLD AUTO: 0.35 K/UL
EOSINOPHIL NFR BLD AUTO: 6.3 %
FOLATE SERPL-MCNC: 13.8 NG/ML
GLUCOSE QUALITATIVE U: NEGATIVE
GLUCOSE SERPL-MCNC: 102 MG/DL
HCT VFR BLD CALC: 45.9 %
HGB BLD-MCNC: 15.7 G/DL
IMM GRANULOCYTES NFR BLD AUTO: 0.2 %
KETONES URINE: NEGATIVE
LEUKOCYTE ESTERASE URINE: NEGATIVE
LYMPHOCYTES # BLD AUTO: 1.53 K/UL
LYMPHOCYTES NFR BLD AUTO: 27.7 %
MAN DIFF?: NORMAL
MCHC RBC-ENTMCNC: 31.8 PG
MCHC RBC-ENTMCNC: 34.2 GM/DL
MCV RBC AUTO: 93.1 FL
MONOCYTES # BLD AUTO: 0.52 K/UL
MONOCYTES NFR BLD AUTO: 9.4 %
MYELOPEROXIDASE AB SER QL IA: 6.6 UNITS
MYELOPEROXIDASE CELLS FLD QL: NEGATIVE
NEUTROPHILS # BLD AUTO: 3.04 K/UL
NEUTROPHILS NFR BLD AUTO: 55.1 %
NITRITE URINE: NEGATIVE
PH URINE: 7
PLATELET # BLD AUTO: 294 K/UL
POTASSIUM SERPL-SCNC: 4.6 MMOL/L
PROT SERPL-MCNC: 6.7 G/DL
PROTEIN URINE: NEGATIVE
PROTEINASE3 AB SER IA-ACNC: <5 UNITS
PROTEINASE3 AB SER-ACNC: NEGATIVE
RBC # BLD: 4.93 M/UL
RBC # FLD: 12.6 %
RHEUMATOID FACT SER QL: <10 IU/ML
SODIUM SERPL-SCNC: 140 MMOL/L
SPECIFIC GRAVITY URINE: 1.01
THYROGLOB AB SERPL-ACNC: <20 IU/ML
THYROPEROXIDASE AB SERPL IA-ACNC: <10 IU/ML
UROBILINOGEN URINE: NORMAL
VIT B12 SERPL-MCNC: 438 PG/ML
WBC # FLD AUTO: 5.52 K/UL

## 2022-06-06 LAB
ALBUMIN MFR SERPL ELPH: 63.8 %
ALBUMIN SERPL-MCNC: 4.3 G/DL
ALBUMIN/GLOB SERPL: 1.8 RATIO
ALPHA1 GLOB MFR SERPL ELPH: 3.9 %
ALPHA1 GLOB SERPL ELPH-MCNC: 0.3 G/DL
ALPHA2 GLOB MFR SERPL ELPH: 8.1 %
ALPHA2 GLOB SERPL ELPH-MCNC: 0.5 G/DL
B-GLOBULIN MFR SERPL ELPH: 9.7 %
B-GLOBULIN SERPL ELPH-MCNC: 0.6 G/DL
CCP AB SER IA-ACNC: <8 UNITS
CENTROMERE IGG SER-ACNC: 0.3 CD:130001892
DSDNA AB SER-ACNC: 14 IU/ML
ENA JO1 AB SER IA-ACNC: <0.2 AL
ENA RNP AB SER IA-ACNC: 0.2 AL
ENA SCL70 IGG SER IA-ACNC: <0.2 AL
ENA SM AB SER IA-ACNC: <0.2 AL
ENA SS-A AB SER IA-ACNC: 3.6 AL
ENA SS-B AB SER IA-ACNC: <0.2 AL
GAMMA GLOB FLD ELPH-MCNC: 1 G/DL
GAMMA GLOB MFR SERPL ELPH: 14.5 %
INTERPRETATION SERPL IEP-IMP: NORMAL
M PROTEIN SPEC IFE-MCNC: NORMAL
PROT SERPL-MCNC: 6.7 G/DL
PROT SERPL-MCNC: 6.7 G/DL
RF+CCP IGG SER-IMP: NEGATIVE

## 2022-06-07 LAB
G6PD SER-CCNC: 16.3 U/G HGB
HISTONE AB SER QL: 2.3 UNITS

## 2022-06-16 LAB
EJ AB SER QL: NEGATIVE
ENA JO1 AB SER IA-ACNC: <20 UNITS
ENA PM/SCL AB SER-ACNC: <20 UNITS
ENA SM+RNP AB SER IA-ACNC: <20 UNITS
ENA SS-A IGG SER QL: 118 UNITS
FIBRILLARIN AB SER QL: NEGATIVE
KU AB SER QL: NEGATIVE
MDA-5 (P140)(CADM-140): <20 UNITS
MI2 AB SER QL: NEGATIVE
NXP-2 (P140): <20 UNITS
OJ AB SER QL: NEGATIVE
PL12 AB SER QL: NEGATIVE
PL7 AB SER QL: NEGATIVE
SRP AB SERPL QL: NEGATIVE
TIF GAMMA (P155/140): <20 UNITS
U2 SNRNP AB SER QL: NEGATIVE

## 2022-07-06 ENCOUNTER — APPOINTMENT (OUTPATIENT)
Dept: RHEUMATOLOGY | Facility: CLINIC | Age: 57
End: 2022-07-06

## 2022-07-06 VITALS
HEART RATE: 89 BPM | HEIGHT: 70 IN | WEIGHT: 227 LBS | OXYGEN SATURATION: 99 % | SYSTOLIC BLOOD PRESSURE: 118 MMHG | TEMPERATURE: 96.6 F | DIASTOLIC BLOOD PRESSURE: 75 MMHG | BODY MASS INDEX: 32.5 KG/M2

## 2022-07-06 PROCEDURE — 99214 OFFICE O/P EST MOD 30 MIN: CPT | Mod: 25

## 2022-07-06 PROCEDURE — 20551 NJX 1 TENDON ORIGIN/INSJ: CPT

## 2022-07-06 RX ORDER — TRIAMCINOLONE ACETONIDE 40 MG/ML
40 SUSPENSION INTRA-ARTERIAL; INTRAMUSCULAR
Qty: 4 | Refills: 0 | Status: COMPLETED | OUTPATIENT
Start: 2022-07-06

## 2022-07-06 RX ORDER — AZELASTINE HYDROCHLORIDE 137 UG/1
0.1 SPRAY, METERED NASAL
Qty: 30 | Refills: 0 | Status: ACTIVE | COMMUNITY
Start: 2022-05-09

## 2022-07-06 RX ORDER — IBUPROFEN 800 MG/1
800 TABLET, FILM COATED ORAL
Qty: 24 | Refills: 0 | Status: DISCONTINUED | COMMUNITY
Start: 2017-09-18 | End: 2022-07-06

## 2022-07-06 RX ADMIN — TRIAMCINOLONE ACETONIDE 0 MG/ML: 40 INJECTION, SUSPENSION INTRA-ARTICULAR; INTRAMUSCULAR at 00:00

## 2022-08-19 ENCOUNTER — APPOINTMENT (OUTPATIENT)
Dept: FAMILY MEDICINE | Facility: CLINIC | Age: 57
End: 2022-08-19

## 2022-08-19 ENCOUNTER — NON-APPOINTMENT (OUTPATIENT)
Age: 57
End: 2022-08-19

## 2022-08-19 DIAGNOSIS — B35.6 TINEA CRURIS: ICD-10-CM

## 2022-08-19 PROCEDURE — 99213 OFFICE O/P EST LOW 20 MIN: CPT

## 2022-08-21 PROBLEM — B35.6 JOCK ITCH: Status: ACTIVE | Noted: 2022-08-19

## 2022-08-21 NOTE — HISTORY OF PRESENT ILLNESS
[FreeTextEntry8] : Pt c/o groin itching and rash of scrotum and inner thigh x 6 days. Pt applying lotrimin which helps slightly.

## 2022-10-24 ENCOUNTER — RESULT REVIEW (OUTPATIENT)
Age: 57
End: 2022-10-24

## 2022-10-25 ENCOUNTER — NON-APPOINTMENT (OUTPATIENT)
Age: 57
End: 2022-10-25

## 2022-10-25 ENCOUNTER — APPOINTMENT (OUTPATIENT)
Dept: FAMILY MEDICINE | Facility: CLINIC | Age: 57
End: 2022-10-25

## 2022-10-25 VITALS
TEMPERATURE: 97.7 F | HEART RATE: 77 BPM | OXYGEN SATURATION: 97 % | WEIGHT: 222 LBS | BODY MASS INDEX: 31.78 KG/M2 | SYSTOLIC BLOOD PRESSURE: 124 MMHG | HEIGHT: 70 IN | DIASTOLIC BLOOD PRESSURE: 80 MMHG

## 2022-10-25 DIAGNOSIS — M25.511 PAIN IN RIGHT SHOULDER: ICD-10-CM

## 2022-10-25 DIAGNOSIS — Z23 ENCOUNTER FOR IMMUNIZATION: ICD-10-CM

## 2022-10-25 DIAGNOSIS — R74.8 ABNORMAL LEVELS OF OTHER SERUM ENZYMES: ICD-10-CM

## 2022-10-25 DIAGNOSIS — G47.00 INSOMNIA, UNSPECIFIED: ICD-10-CM

## 2022-10-25 PROCEDURE — 99396 PREV VISIT EST AGE 40-64: CPT | Mod: 25

## 2022-10-25 PROCEDURE — 90686 IIV4 VACC NO PRSV 0.5 ML IM: CPT

## 2022-10-25 PROCEDURE — G0008: CPT

## 2022-10-25 PROCEDURE — 99214 OFFICE O/P EST MOD 30 MIN: CPT | Mod: 25

## 2022-10-27 LAB
ALBUMIN SERPL ELPH-MCNC: 4.6 G/DL
ALP BLD-CCNC: 67 U/L
ALT SERPL-CCNC: 28 U/L
ANION GAP SERPL CALC-SCNC: 13 MMOL/L
APPEARANCE: CLEAR
AST SERPL-CCNC: 25 U/L
BASOPHILS # BLD AUTO: 0.08 K/UL
BASOPHILS NFR BLD AUTO: 1.2 %
BILIRUB SERPL-MCNC: 0.8 MG/DL
BILIRUBIN URINE: NEGATIVE
BLOOD URINE: NEGATIVE
BUN SERPL-MCNC: 11 MG/DL
CALCIUM SERPL-MCNC: 9.5 MG/DL
CHLORIDE SERPL-SCNC: 103 MMOL/L
CHOLEST SERPL-MCNC: 147 MG/DL
CK SERPL-CCNC: 367 U/L
CO2 SERPL-SCNC: 25 MMOL/L
COLOR: YELLOW
CREAT SERPL-MCNC: 0.98 MG/DL
EGFR: 90 ML/MIN/1.73M2
EOSINOPHIL # BLD AUTO: 0.41 K/UL
EOSINOPHIL NFR BLD AUTO: 6.3 %
ESTIMATED AVERAGE GLUCOSE: 105 MG/DL
GLUCOSE QUALITATIVE U: NEGATIVE
GLUCOSE SERPL-MCNC: 87 MG/DL
HBA1C MFR BLD HPLC: 5.3 %
HCT VFR BLD CALC: 48.7 %
HDLC SERPL-MCNC: 44 MG/DL
HGB BLD-MCNC: 16.8 G/DL
IMM GRANULOCYTES NFR BLD AUTO: 0.2 %
KETONES URINE: NEGATIVE
LDLC SERPL CALC-MCNC: 93 MG/DL
LEUKOCYTE ESTERASE URINE: NEGATIVE
LYMPHOCYTES # BLD AUTO: 1.78 K/UL
LYMPHOCYTES NFR BLD AUTO: 27.3 %
MAN DIFF?: NORMAL
MCHC RBC-ENTMCNC: 31.6 PG
MCHC RBC-ENTMCNC: 34.5 GM/DL
MCV RBC AUTO: 91.7 FL
MONOCYTES # BLD AUTO: 0.54 K/UL
MONOCYTES NFR BLD AUTO: 8.3 %
NEUTROPHILS # BLD AUTO: 3.69 K/UL
NEUTROPHILS NFR BLD AUTO: 56.7 %
NITRITE URINE: NEGATIVE
NONHDLC SERPL-MCNC: 103 MG/DL
PH URINE: 6
PLATELET # BLD AUTO: 315 K/UL
POTASSIUM SERPL-SCNC: 4.4 MMOL/L
PROT SERPL-MCNC: 6.6 G/DL
PROTEIN URINE: NORMAL
PSA SERPL-MCNC: 2.29 NG/ML
RBC # BLD: 5.31 M/UL
RBC # FLD: 12.3 %
SODIUM SERPL-SCNC: 141 MMOL/L
SPECIFIC GRAVITY URINE: 1.03
T4 FREE SERPL-MCNC: 1.3 NG/DL
TRIGL SERPL-MCNC: 49 MG/DL
TSH SERPL-ACNC: 1.11 UIU/ML
UROBILINOGEN URINE: NORMAL
WBC # FLD AUTO: 6.51 K/UL

## 2022-10-27 NOTE — HISTORY OF PRESENT ILLNESS
[de-identified] : Pt for CPE\par Pt c/o urinary hesitancy, nocturia 1-2x a night. Declines ERIN today\par Pt has chronic insomnia, wakes up easily. \par Pt c/o several months of R shoulder pain. Pt has pain w/ flexion, adduction of shoulder. Causes pain and has trouble sleeping due to pain. Pt applying warm compresses. Denies hx of trauma. Pt had arthroscopic R shoulder surgery in 2014. \par Pt has DARREN, uses CPAP. \par Pt has elevated CK levels, saw rheum Dr. Magana, neg genetic w/u. Will monitor\par Pt had colonoscopy 10/2022 single polyp done by GI Dr. Raymundo\par Pt sees Dr. Ceja (cardio) every 6-8 months for nonobstructive CAD, and htn. Pt has occasional PVCs, last stress test 1.5 yrs ago. Pt takes hctz and lisinopril for htn.\par Pt also c/o chronic low back pain w/ herniated discs and L hip pain. Pt has had hx of L4/5/S1 epidural 6 yrs ago\par \par Ex smoker 1ppd x 15 yrs, quit 20+ yrs ago\par ETOH social\par Denies drugs\par Works in real estate and plumbing\par , 2 children

## 2022-10-27 NOTE — HEALTH RISK ASSESSMENT
[Patient reported colonoscopy was normal] : Patient reported colonoscopy was normal [ColonoscopyDate] : 10/22 [ColonoscopyComments] : single polyp, repeat in 3-5 yrs

## 2022-10-27 NOTE — ASSESSMENT
[FreeTextEntry1] : R shoulder pain - nsaids prn. suspect rotator cuff tear. check MR R shoulder. ortho referral\par urinary frequency - check PSA. consider trial of flomax. pt declines at this time. check UA w/ reflex cx\par insomnia - advised pt on proper sleep hygiene including decreased caffeine and screen time in the PM hours.\par elevated CK - monitor levels, prior w/u neg\par htn- stable, refilled meds\par Healthy diet and regular exercise regimen discussed w/ pt.\par Screening labs ordered\par referral to GI/colorectal for screening colonoscopy\par Risks and benefits of flu vaccine discussed w/ pt. Consent given by pt, flu vaccine administered. Pt tolerated well \par Any questions call office

## 2023-01-17 ENCOUNTER — TRANSCRIPTION ENCOUNTER (OUTPATIENT)
Age: 58
End: 2023-01-17

## 2023-01-26 ENCOUNTER — OFFICE (OUTPATIENT)
Dept: URBAN - METROPOLITAN AREA CLINIC 12 | Facility: CLINIC | Age: 58
Setting detail: OPHTHALMOLOGY
End: 2023-01-26
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
## 2023-01-26 PROCEDURE — NO SHOW FE NO SHOW FEE: Performed by: OPHTHALMOLOGY

## 2023-02-13 ENCOUNTER — APPOINTMENT (OUTPATIENT)
Dept: UROLOGY | Facility: CLINIC | Age: 58
End: 2023-02-13
Payer: COMMERCIAL

## 2023-02-13 DIAGNOSIS — H57.059 TONIC PUPIL, UNSPECIFIED EYE: ICD-10-CM

## 2023-02-13 DIAGNOSIS — R82.90 UNSPECIFIED ABNORMAL FINDINGS IN URINE: ICD-10-CM

## 2023-02-13 PROCEDURE — 99204 OFFICE O/P NEW MOD 45 MIN: CPT

## 2023-02-13 NOTE — PHYSICAL EXAM
[General Appearance - Well Developed] : well developed [General Appearance - Well Nourished] : well nourished [Normal Appearance] : normal appearance [Well Groomed] : well groomed [General Appearance - In No Acute Distress] : no acute distress [Edema] : no peripheral edema [Respiration, Rhythm And Depth] : normal respiratory rhythm and effort [Exaggerated Use Of Accessory Muscles For Inspiration] : no accessory muscle use [Abdomen Soft] : soft [Abdomen Tenderness] : non-tender [Prostate Tenderness] : the prostate was not tender [No Prostate Nodules] : no prostate nodules [Prostate Size ___ gm] : prostate size [unfilled] gm [Normal Station and Gait] : the gait and station were normal for the patient's age [] : no rash [No Focal Deficits] : no focal deficits [Oriented To Time, Place, And Person] : oriented to person, place, and time [Affect] : the affect was normal [Mood] : the mood was normal [Not Anxious] : not anxious

## 2023-02-13 NOTE — ASSESSMENT
[FreeTextEntry1] : Overactive Bladder:\par start oxybutynin 10mg daily\par discussed dry eyes, dry mouth, constipation and memory issues in elderly side effect profile\par f/u 6 weeks\par \par Odor in urine:\par check UA and culture

## 2023-02-13 NOTE — REVIEW OF SYSTEMS
[see HPI] : see HPI [Arthralgias] : arthralgias [Joint Pain] : joint pain [Negative] : Gastrointestinal

## 2023-02-13 NOTE — HISTORY OF PRESENT ILLNESS
[FreeTextEntry1] : 56 yo M presents to establish care\par He reports bothersome urinary frequency and urgency throughout the day. Nocturia 1-2x depending on fluid intake\par He drinks 32 ounces of coffea during AM and takes 12.5 mg hctz at night\par He reports stream is good. Denies straining, dysuria or hematuria. He reports odor in urine worse with dark yellow urine. \par \par PSA 2.29 on 10/2022\par \par pvr 33cc

## 2023-02-14 LAB
APPEARANCE: CLEAR
BACTERIA: NEGATIVE
BILIRUBIN URINE: NEGATIVE
BLOOD URINE: NEGATIVE
COLOR: YELLOW
GLUCOSE QUALITATIVE U: NEGATIVE
HYALINE CASTS: 0 /LPF
KETONES URINE: NEGATIVE
LEUKOCYTE ESTERASE URINE: NEGATIVE
MICROSCOPIC-UA: NORMAL
NITRITE URINE: NEGATIVE
PH URINE: 6.5
PROTEIN URINE: NORMAL
RED BLOOD CELLS URINE: 1 /HPF
SPECIFIC GRAVITY URINE: 1.02
SQUAMOUS EPITHELIAL CELLS: 0 /HPF
UROBILINOGEN URINE: NORMAL
WHITE BLOOD CELLS URINE: 0 /HPF

## 2023-02-15 LAB — BACTERIA UR CULT: NORMAL

## 2023-03-27 ENCOUNTER — APPOINTMENT (OUTPATIENT)
Dept: UROLOGY | Facility: CLINIC | Age: 58
End: 2023-03-27
Payer: COMMERCIAL

## 2023-03-27 VITALS
BODY MASS INDEX: 32.93 KG/M2 | HEIGHT: 70 IN | DIASTOLIC BLOOD PRESSURE: 92 MMHG | WEIGHT: 230 LBS | RESPIRATION RATE: 16 BRPM | SYSTOLIC BLOOD PRESSURE: 142 MMHG | HEART RATE: 86 BPM

## 2023-03-27 DIAGNOSIS — R35.0 FREQUENCY OF MICTURITION: ICD-10-CM

## 2023-03-27 PROCEDURE — 99213 OFFICE O/P EST LOW 20 MIN: CPT

## 2023-03-27 NOTE — HISTORY OF PRESENT ILLNESS
[FreeTextEntry1] : 56 yo M presents for f/u\par He tried course of oxybutynin, he had dry mouth / cough. He doesn't know if medication helped or his mental strength to not use the restroom with all urges is helping. \par He drinks 32 ounces of coffea during AM and takes 12.5 mg hctz at night\par He reports stream is good. Has frequecy and urgency. Denies straining, dysuria or hematuria. \par \par \par PSA 2.29 on 10/2022\par pvr 33cc previously

## 2023-03-27 NOTE — ASSESSMENT
[FreeTextEntry1] : Overactive Bladder:\par will hold off oxybutynin due to dry mouth/cough\par Pt wants to focus on his mental strength and avoid rushing with all urges. \par f/u 6 months, if needed will discuss another OAB medication.

## 2023-07-07 ENCOUNTER — APPOINTMENT (OUTPATIENT)
Dept: FAMILY MEDICINE | Facility: CLINIC | Age: 58
End: 2023-07-07
Payer: COMMERCIAL

## 2023-07-07 VITALS
BODY MASS INDEX: 31.64 KG/M2 | TEMPERATURE: 97.5 F | DIASTOLIC BLOOD PRESSURE: 72 MMHG | HEIGHT: 70 IN | WEIGHT: 221 LBS | OXYGEN SATURATION: 97 % | HEART RATE: 77 BPM | SYSTOLIC BLOOD PRESSURE: 136 MMHG

## 2023-07-07 DIAGNOSIS — R21 RASH AND OTHER NONSPECIFIC SKIN ERUPTION: ICD-10-CM

## 2023-07-07 PROCEDURE — 99213 OFFICE O/P EST LOW 20 MIN: CPT

## 2023-07-07 NOTE — HISTORY OF PRESENT ILLNESS
[FreeTextEntry8] : 58 year old male presents with rash on right arm. States he gets this every summer. Starts as small red dots that he scratches and then spreads. Does admit to working in yard but wearing long sleeves and gloves. \par Of note, is leaving for [place] in 3 days

## 2023-07-07 NOTE — PLAN
[FreeTextEntry1] : Rash\par - likely contact dermatitis\par - trial of triamcinolone \par - will send medrol maggie and abx as pt leaving for [place], in case of infection.

## 2023-07-07 NOTE — PHYSICAL EXAM
[No Acute Distress] : no acute distress [Well-Appearing] : well-appearing [de-identified] : + erythematous plaque on right arm

## 2023-11-17 ENCOUNTER — APPOINTMENT (OUTPATIENT)
Dept: PULMONOLOGY | Facility: CLINIC | Age: 58
End: 2023-11-17
Payer: COMMERCIAL

## 2023-11-17 VITALS
HEART RATE: 92 BPM | WEIGHT: 230 LBS | SYSTOLIC BLOOD PRESSURE: 132 MMHG | OXYGEN SATURATION: 97 % | RESPIRATION RATE: 16 BRPM | HEIGHT: 70 IN | DIASTOLIC BLOOD PRESSURE: 68 MMHG | BODY MASS INDEX: 32.93 KG/M2

## 2023-11-17 DIAGNOSIS — K21.9 GASTRO-ESOPHAGEAL REFLUX DISEASE W/OUT ESOPHAGITIS: ICD-10-CM

## 2023-11-17 PROCEDURE — 99204 OFFICE O/P NEW MOD 45 MIN: CPT

## 2023-11-17 RX ORDER — FAMOTIDINE 20 MG/1
20 TABLET, FILM COATED ORAL DAILY
Qty: 30 | Refills: 5 | Status: ACTIVE | COMMUNITY
Start: 2023-11-17 | End: 1900-01-01

## 2023-11-17 RX ORDER — TIZANIDINE 4 MG/1
4 TABLET ORAL
Qty: 30 | Refills: 1 | Status: DISCONTINUED | COMMUNITY
Start: 2022-07-06 | End: 2023-11-17

## 2023-11-17 RX ORDER — METHYLPREDNISOLONE 4 MG/1
4 TABLET ORAL
Qty: 1 | Refills: 0 | Status: DISCONTINUED | COMMUNITY
Start: 2023-07-07 | End: 2023-11-17

## 2023-11-17 RX ORDER — OXYBUTYNIN CHLORIDE 10 MG/1
10 TABLET, EXTENDED RELEASE ORAL DAILY
Qty: 60 | Refills: 1 | Status: DISCONTINUED | COMMUNITY
Start: 2023-02-13 | End: 2023-11-17

## 2023-11-17 RX ORDER — NYSTATIN 100000 1/G
100000 POWDER TOPICAL 3 TIMES DAILY
Qty: 1 | Refills: 0 | Status: DISCONTINUED | COMMUNITY
Start: 2022-08-19 | End: 2023-11-17

## 2023-11-17 RX ORDER — TRIAMCINOLONE ACETONIDE 1 MG/G
0.1 CREAM TOPICAL TWICE DAILY
Qty: 1 | Refills: 1 | Status: DISCONTINUED | COMMUNITY
Start: 2023-07-07 | End: 2023-11-17

## 2023-11-17 RX ORDER — CEPHALEXIN 500 MG/1
500 CAPSULE ORAL 3 TIMES DAILY
Qty: 30 | Refills: 0 | Status: DISCONTINUED | COMMUNITY
Start: 2023-07-07 | End: 2023-11-17

## 2023-11-30 ENCOUNTER — OUTPATIENT (OUTPATIENT)
Dept: OUTPATIENT SERVICES | Facility: HOSPITAL | Age: 58
LOS: 1 days | End: 2023-11-30
Payer: COMMERCIAL

## 2023-11-30 DIAGNOSIS — G47.33 OBSTRUCTIVE SLEEP APNEA (ADULT) (PEDIATRIC): ICD-10-CM

## 2023-11-30 PROCEDURE — 95800 SLP STDY UNATTENDED: CPT | Mod: 26

## 2023-11-30 PROCEDURE — 95800 SLP STDY UNATTENDED: CPT

## 2023-12-05 ENCOUNTER — APPOINTMENT (OUTPATIENT)
Dept: RHEUMATOLOGY | Facility: CLINIC | Age: 58
End: 2023-12-05
Payer: COMMERCIAL

## 2023-12-05 VITALS
WEIGHT: 235 LBS | OXYGEN SATURATION: 97 % | SYSTOLIC BLOOD PRESSURE: 102 MMHG | HEIGHT: 70 IN | BODY MASS INDEX: 33.64 KG/M2 | HEART RATE: 67 BPM | TEMPERATURE: 97.4 F | DIASTOLIC BLOOD PRESSURE: 80 MMHG

## 2023-12-05 DIAGNOSIS — R89.9 UNSPECIFIED ABNORMAL FINDING IN SPECIMENS FROM OTHER ORGANS, SYSTEMS AND TISSUES: ICD-10-CM

## 2023-12-05 DIAGNOSIS — R76.8 OTHER SPECIFIED ABNORMAL IMMUNOLOGICAL FINDINGS IN SERUM: ICD-10-CM

## 2023-12-05 PROCEDURE — 99215 OFFICE O/P EST HI 40 MIN: CPT

## 2023-12-05 RX ORDER — COLD-HOT PACK
EACH MISCELLANEOUS
Refills: 0 | Status: ACTIVE | COMMUNITY

## 2023-12-05 RX ORDER — FEXOFENADINE HCL 60 MG
TABLET ORAL
Refills: 0 | Status: ACTIVE | COMMUNITY

## 2023-12-05 RX ORDER — OMEGA-3/DHA/EPA/FISH OIL 300-1000MG
CAPSULE ORAL
Refills: 0 | Status: ACTIVE | COMMUNITY

## 2023-12-05 RX ORDER — ASCORBIC ACID 500 MG
TABLET ORAL
Refills: 0 | Status: ACTIVE | COMMUNITY

## 2023-12-05 RX ORDER — PSYLLIUM HUSK 0.4 G
CAPSULE ORAL
Refills: 0 | Status: ACTIVE | COMMUNITY

## 2023-12-05 RX ORDER — ERGOCALCIFEROL 1.25 MG/1
1.25 MG CAPSULE, LIQUID FILLED ORAL
Refills: 0 | Status: ACTIVE | COMMUNITY

## 2023-12-07 ENCOUNTER — OFFICE (OUTPATIENT)
Dept: URBAN - METROPOLITAN AREA CLINIC 12 | Facility: CLINIC | Age: 58
Setting detail: OPHTHALMOLOGY
End: 2023-12-07
Payer: COMMERCIAL

## 2023-12-07 DIAGNOSIS — H40.013: ICD-10-CM

## 2023-12-07 DIAGNOSIS — H16.223: ICD-10-CM

## 2023-12-07 DIAGNOSIS — H43.393: ICD-10-CM

## 2023-12-07 DIAGNOSIS — H90.3: ICD-10-CM

## 2023-12-07 DIAGNOSIS — H01.001: ICD-10-CM

## 2023-12-07 DIAGNOSIS — H35.033: ICD-10-CM

## 2023-12-07 DIAGNOSIS — H52.4: ICD-10-CM

## 2023-12-07 DIAGNOSIS — H01.004: ICD-10-CM

## 2023-12-07 DIAGNOSIS — H57.051: ICD-10-CM

## 2023-12-07 DIAGNOSIS — H25.13: ICD-10-CM

## 2023-12-07 DIAGNOSIS — H43.813: ICD-10-CM

## 2023-12-07 PROCEDURE — 92557 COMPREHENSIVE HEARING TEST: CPT

## 2023-12-07 PROCEDURE — 92014 COMPRE OPH EXAM EST PT 1/>: CPT | Performed by: OPHTHALMOLOGY

## 2023-12-07 PROCEDURE — 92015 DETERMINE REFRACTIVE STATE: CPT | Performed by: OPHTHALMOLOGY

## 2023-12-07 PROCEDURE — 92250 FUNDUS PHOTOGRAPHY W/I&R: CPT | Performed by: OPHTHALMOLOGY

## 2023-12-07 PROCEDURE — 92567 TYMPANOMETRY: CPT

## 2023-12-07 ASSESSMENT — LID EXAM ASSESSMENTS
OS_BLEPHARITIS: LLL LUL 1+
OD_BLEPHARITIS: RLL RUL 1+

## 2023-12-07 ASSESSMENT — LID POSITION - PTOSIS: OD_PTOSIS: RUL 1+

## 2023-12-07 ASSESSMENT — CONFRONTATIONAL VISUAL FIELD TEST (CVF)
OD_FINDINGS: FULL
OS_FINDINGS: FULL

## 2023-12-08 ENCOUNTER — APPOINTMENT (OUTPATIENT)
Dept: UROLOGY | Facility: CLINIC | Age: 58
End: 2023-12-08
Payer: COMMERCIAL

## 2023-12-08 VITALS
DIASTOLIC BLOOD PRESSURE: 83 MMHG | SYSTOLIC BLOOD PRESSURE: 118 MMHG | HEIGHT: 70 IN | WEIGHT: 235 LBS | OXYGEN SATURATION: 97 % | BODY MASS INDEX: 33.64 KG/M2 | HEART RATE: 81 BPM

## 2023-12-08 DIAGNOSIS — Z12.5 ENCOUNTER FOR SCREENING FOR MALIGNANT NEOPLASM OF PROSTATE: ICD-10-CM

## 2023-12-08 LAB
ALBUMIN SERPL ELPH-MCNC: 5 G/DL
ALP BLD-CCNC: 70 U/L
ALT SERPL-CCNC: 33 U/L
ANION GAP SERPL CALC-SCNC: 12 MMOL/L
AST SERPL-CCNC: 28 U/L
BASOPHILS # BLD AUTO: 0.13 K/UL
BASOPHILS NFR BLD AUTO: 2.1 %
BILIRUB SERPL-MCNC: 0.6 MG/DL
BUN SERPL-MCNC: 12 MG/DL
C3 SERPL-MCNC: 112 MG/DL
C4 SERPL-MCNC: 28 MG/DL
CALCIUM SERPL-MCNC: 9.8 MG/DL
CHLORIDE SERPL-SCNC: 101 MMOL/L
CK SERPL-CCNC: 453 U/L
CO2 SERPL-SCNC: 28 MMOL/L
CREAT SERPL-MCNC: 1.01 MG/DL
CRP SERPL-MCNC: <3 MG/L
EGFR: 86 ML/MIN/1.73M2
EOSINOPHIL # BLD AUTO: 0.56 K/UL
EOSINOPHIL NFR BLD AUTO: 8.9 %
ERYTHROCYTE [SEDIMENTATION RATE] IN BLOOD BY WESTERGREN METHOD: 4 MM/HR
GLUCOSE SERPL-MCNC: 89 MG/DL
HCT VFR BLD CALC: 50.5 %
HGB BLD-MCNC: 16.9 G/DL
IMM GRANULOCYTES NFR BLD AUTO: 0.3 %
LYMPHOCYTES # BLD AUTO: 1.66 K/UL
LYMPHOCYTES NFR BLD AUTO: 26.3 %
MAN DIFF?: NORMAL
MCHC RBC-ENTMCNC: 31.5 PG
MCHC RBC-ENTMCNC: 33.5 GM/DL
MCV RBC AUTO: 94 FL
MONOCYTES # BLD AUTO: 0.48 K/UL
MONOCYTES NFR BLD AUTO: 7.6 %
NEUTROPHILS # BLD AUTO: 3.46 K/UL
NEUTROPHILS NFR BLD AUTO: 54.8 %
PLATELET # BLD AUTO: 323 K/UL
POTASSIUM SERPL-SCNC: 5 MMOL/L
PROT SERPL-MCNC: 7.4 G/DL
RBC # BLD: 5.37 M/UL
RBC # FLD: 13 %
SODIUM SERPL-SCNC: 140 MMOL/L
WBC # FLD AUTO: 6.31 K/UL

## 2023-12-08 PROCEDURE — 99213 OFFICE O/P EST LOW 20 MIN: CPT

## 2023-12-09 ASSESSMENT — REFRACTION_CURRENTRX
OS_CYLINDER: -1.25
OD_CYLINDER: -1.00
OS_SPHERE: -4.25
OD_AXIS: 060
OD_VPRISM_DIRECTION: PROGS
OS_ADD: +2.00
OS_AXIS: 110
OD_OVR_VA: 20/
OD_ADD: +2.00
OD_SPHERE: -3.50
OS_OVR_VA: 20/
OS_VPRISM_DIRECTION: PROGS

## 2023-12-09 ASSESSMENT — REFRACTION_MANIFEST
OD_ADD: +2.00
OD_CYLINDER: -1.00
OS_CYLINDER: -1.50
OS_VA1: 20/20
OS_SPHERE: -3.50
OD_AXIS: 070
OD_VA1: 20/20
OS_AXIS: 105
OD_SPHERE: -3.00
OS_ADD: +2.00

## 2023-12-09 ASSESSMENT — REFRACTION_AUTOREFRACTION
OD_SPHERE: -2.50
OS_SPHERE: -3.00
OS_AXIS: 098
OS_CYLINDER: -1.75
OD_AXIS: 080
OD_CYLINDER: -1.25

## 2023-12-09 ASSESSMENT — SPHEQUIV_DERIVED
OS_SPHEQUIV: -4.25
OD_SPHEQUIV: -3.5
OS_SPHEQUIV: -3.875
OD_SPHEQUIV: -3.125

## 2023-12-10 LAB
CENTROMERE IGG SER-ACNC: 0.2 AL
DSDNA AB SER-ACNC: <12 IU/ML
ENA RNP AB SER IA-ACNC: 0.2 AL
ENA SCL70 IGG SER IA-ACNC: <0.2 AL
ENA SM AB SER IA-ACNC: <0.2 AL
ENA SS-A AB SER IA-ACNC: 3.8 AL
ENA SS-B AB SER IA-ACNC: <0.2 AL
HISTONE AB SER QL: 1.4 UNITS
MYELOPEROXIDASE AB SER QL IA: 5.2 UNITS
MYELOPEROXIDASE CELLS FLD QL: NEGATIVE
PROTEINASE3 AB SER IA-ACNC: <5 UNITS
PROTEINASE3 AB SER-ACNC: NEGATIVE
THYROGLOB AB SERPL-ACNC: <20 IU/ML
THYROPEROXIDASE AB SERPL IA-ACNC: <10 IU/ML

## 2023-12-11 LAB
ALBUMIN MFR SERPL ELPH: 62.4 %
ALBUMIN SERPL-MCNC: 4.6 G/DL
ALBUMIN/GLOB SERPL: 1.6 RATIO
ALPHA1 GLOB MFR SERPL ELPH: 4.2 %
ALPHA1 GLOB SERPL ELPH-MCNC: 0.3 G/DL
ALPHA2 GLOB MFR SERPL ELPH: 8.6 %
ALPHA2 GLOB SERPL ELPH-MCNC: 0.6 G/DL
ANA PAT FLD IF-IMP: ABNORMAL
ANA SER IF-ACNC: ABNORMAL
B-GLOBULIN MFR SERPL ELPH: 10.1 %
B-GLOBULIN SERPL ELPH-MCNC: 0.7 G/DL
GAMMA GLOB FLD ELPH-MCNC: 1.1 G/DL
GAMMA GLOB MFR SERPL ELPH: 14.7 %
INTERPRETATION SERPL IEP-IMP: NORMAL
M PROTEIN SPEC IFE-MCNC: NORMAL
PROT SERPL-MCNC: 7.4 G/DL
PROT SERPL-MCNC: 7.4 G/DL
PSA SERPL-MCNC: 2.65 NG/ML

## 2023-12-14 LAB — G6PD SER-CCNC: 11.3 U/G HGB

## 2023-12-15 ENCOUNTER — APPOINTMENT (OUTPATIENT)
Dept: FAMILY MEDICINE | Facility: CLINIC | Age: 58
End: 2023-12-15

## 2023-12-18 ENCOUNTER — APPOINTMENT (OUTPATIENT)
Dept: PULMONOLOGY | Facility: CLINIC | Age: 58
End: 2023-12-18
Payer: COMMERCIAL

## 2023-12-18 VITALS
HEART RATE: 93 BPM | WEIGHT: 235 LBS | DIASTOLIC BLOOD PRESSURE: 84 MMHG | SYSTOLIC BLOOD PRESSURE: 122 MMHG | BODY MASS INDEX: 33.64 KG/M2 | RESPIRATION RATE: 16 BRPM | OXYGEN SATURATION: 97 % | HEIGHT: 70 IN

## 2023-12-18 PROCEDURE — 99214 OFFICE O/P EST MOD 30 MIN: CPT

## 2023-12-18 NOTE — ASSESSMENT
[FreeTextEntry1] : The patient has moderate obstructive sleep apnea.  AutoPap at 6-16 cm H2O  was ordered for the patient and will be delivered to the patient's house.  Appropriate use parameters were discussed with the patient.  Patient will return after being on AutoPap for 6 to 8 weeks so that we can review compliance and efficacy and address any problems the patient may have with AutoPAP.

## 2023-12-18 NOTE — HISTORY OF PRESENT ILLNESS
[Obstructive Sleep Apnea] : obstructive sleep apnea [TextBox_100] : 11/23 [TextBox_108] : 17 [TextBox_112] : 99 [TextBox_116] : 81 [TextBox_165] : I reviewed the patient's sleep study with the patient.

## 2023-12-20 ENCOUNTER — APPOINTMENT (OUTPATIENT)
Dept: ORTHOPEDIC SURGERY | Facility: CLINIC | Age: 58
End: 2023-12-20
Payer: COMMERCIAL

## 2023-12-20 VITALS — HEIGHT: 70 IN | BODY MASS INDEX: 33.64 KG/M2 | WEIGHT: 235 LBS

## 2023-12-20 DIAGNOSIS — M54.16 RADICULOPATHY, LUMBAR REGION: ICD-10-CM

## 2023-12-20 PROCEDURE — 99203 OFFICE O/P NEW LOW 30 MIN: CPT

## 2023-12-21 NOTE — HISTORY OF PRESENT ILLNESS
[de-identified] : 12/20/2023 - Patient presents to the office for initial evaluation of right lower back pain and right posterior leg pain. He has a history of lumbar disc degeneration and facet arthropathy dating back 2019. He has in the past undergone treatment with interventional pain management and received successful, epidural, steroid and facet injections. He had been at home, rehabilitating his right shoulder and utilizing the treadmill when he experienced an increase in right lower back pain and right leg pain approximately week ago. Symptoms are isolated to the right leg posteriorly. No left leg symptoms. Denies weakness in leg. Denies changes in bowel or bladder function. He has been using some anti-inflammatory medication and muscle relaxers with minimal result. Pain is worse with early movement in the morning flexion and extension of the lumbar spine as well as ambulation. Symptoms do wax and Edgar through the day.  The patient is a 58 year old male who presents today complaining of right side low back pain Date of Injury/Onset:  1 week ago Pain:    At Rest:   3-4/10 With Activity:   3-4/10 Mechanism of injury:  walking on the treadmill Quality of symptoms:  dull aching pain with radiating burning pain down right leg to ankle. sciatic pain Improves with:  nothing Worse with:  standing Prior treatment:  [facility] Prior Imaging:  no Additional Information: None

## 2023-12-21 NOTE — DISCUSSION/SUMMARY
[de-identified] : Patient will initiate conservative management, home, exercise, protocol and outpatient physical therapy. Reevaluation in six weeks to determine response and possible updated imaging at that point. We did discuss possible interventional pain management, if he is refractory to conservative management.   Prior to appointment and during encounter with patient extensive medical records were reviewed including but not limited to, hospital records, outpatient records, imaging results, and lab data.During this appointment the patient was examined, diagnoses were discussed and explained in a face to face manner. In addition extensive time was spent reviewing aforementioned diagnostic studies. Counseling including abnormal image results, differential diagnoses, treatment options, risk and benefits, lifestyle changes, current condition, and current medications was performed. Patient's comments, questions, and concerns were addressed and patient verbalized understanding. Based on this patient's presentation at our office, which is an orthopedic spine surgeon's office, this patient inherently / intrinsically has a risk, however minute, of developing issues such as Cauda equina syndrome, bowel and bladder changes, or progression of motor or neurological deficits such as paralysis which may be permanent.

## 2023-12-21 NOTE — PHYSICAL EXAM
[Normal Coordination] : normal coordination [Normal DTR UE/LE] : normal DTR UE/LE  [Normal Sensation] : normal sensation [Normal Mood and Affect] : normal mood and affect [Orientated] : orientated [Able to Communicate] : able to communicate [Normal Skin] : normal skin [No Rash] : no rash [No Ulcers] : no ulcers [No Lesions] : no lesions [No obvious lymphadenopathy in areas examined] : no obvious lymphadenopathy in areas examined [Well Developed] : well developed [Peripheral vascular exam is grossly normal] : peripheral vascular exam is grossly normal [No Respiratory Distress] : no respiratory distress [Lungs clear to auscultation bilaterally] : lungs clear to auscultation bilaterally [Normal Bowel Sounds] : normal bowel sounds [Non-Tender] : non-tender [No HSM] : no HSM [No Mass] : no mass [] : non-antalgic [de-identified] : Straight leg rays reproduce hamstring tightness on the right only.

## 2024-01-31 ENCOUNTER — APPOINTMENT (OUTPATIENT)
Dept: ORTHOPEDIC SURGERY | Facility: CLINIC | Age: 59
End: 2024-01-31

## 2024-02-20 ENCOUNTER — RX RENEWAL (OUTPATIENT)
Age: 59
End: 2024-02-20

## 2024-03-27 NOTE — HISTORY OF PRESENT ILLNESS
[FreeTextEntry1] : Was last seen in July 2022 Returns today to reestablish care Interval right shoulder injury at work on 1/18/2023, status post surgery in September and doing PT 3 times a week Denies any Raynaud's symptoms at this time Complains of a rash on his hands on and off especially with change in weather, extremely itchy, has appointment with dermatology in January Occasionally has dry mouth due to CPAP On PPI for the last 2 weeks for GERD Occasionally has pain in both knees, denies any swelling or stiffness, under the care of orthopedics for the same Otherwise review of systems unchanged from prior visit

## 2024-03-27 NOTE — ASSESSMENT
[FreeTextEntry1] : 58-year-old male for follow-up visit  Elevated CPK: - 417(December 2019) -> 638(February 2022) -> 425(June 2022)->453(Dec 2023) -No clinical evidence of underlying inflammatory myositis at this time. -Myositis serologies negative -Has never been on statins -Will monitor  ? Raynaud's: -Asymptomatic at this time - Non-smoker  Positive MAXIMINO: - History of positive MAXIMINO noted on prior labs (1: 80, homogenous pattern, 2016). Repeat MAXIMINO 1: 320, DFS 70 pattern. -Subserologies with positive SSA and positive histone antibody - No clinical evidence of underlying connective tissue disease including Sjogren's or drug-induced lupus at this time - Will monitor clinically and serologically  History of positive ANCA: -Low positive atypical ANCA from prior labs in 2016 - No clinical evidence of vasculitis at this time - Repeat ANCAs negative -Will monitor  Repeat labs including all serologies.  All questions and concerns addressed to my best possible ability, patient verbalizes understanding and is agreeable  Follow-up in  6 to 8 weeks or sooner as needed.

## 2024-03-28 ENCOUNTER — APPOINTMENT (OUTPATIENT)
Dept: FAMILY MEDICINE | Facility: CLINIC | Age: 59
End: 2024-03-28
Payer: COMMERCIAL

## 2024-03-28 ENCOUNTER — APPOINTMENT (OUTPATIENT)
Dept: UROLOGY | Facility: CLINIC | Age: 59
End: 2024-03-28
Payer: COMMERCIAL

## 2024-03-28 VITALS
WEIGHT: 235 LBS | HEART RATE: 90 BPM | BODY MASS INDEX: 33.72 KG/M2 | SYSTOLIC BLOOD PRESSURE: 147 MMHG | DIASTOLIC BLOOD PRESSURE: 90 MMHG

## 2024-03-28 VITALS
BODY MASS INDEX: 33.64 KG/M2 | SYSTOLIC BLOOD PRESSURE: 132 MMHG | TEMPERATURE: 97 F | OXYGEN SATURATION: 97 % | WEIGHT: 235 LBS | DIASTOLIC BLOOD PRESSURE: 80 MMHG | HEART RATE: 95 BPM | HEIGHT: 70 IN

## 2024-03-28 DIAGNOSIS — Z13.0 ENCOUNTER FOR SCREENING FOR OTHER SUSPECTED ENDOCRINE DISORDER: ICD-10-CM

## 2024-03-28 DIAGNOSIS — I10 ESSENTIAL (PRIMARY) HYPERTENSION: ICD-10-CM

## 2024-03-28 DIAGNOSIS — Z00.00 ENCOUNTER FOR GENERAL ADULT MEDICAL EXAMINATION W/OUT ABNORMAL FINDINGS: ICD-10-CM

## 2024-03-28 DIAGNOSIS — Z13.29 ENCOUNTER FOR SCREENING FOR OTHER SUSPECTED ENDOCRINE DISORDER: ICD-10-CM

## 2024-03-28 DIAGNOSIS — N32.81 OVERACTIVE BLADDER: ICD-10-CM

## 2024-03-28 DIAGNOSIS — Z13.220 ENCOUNTER FOR SCREENING FOR LIPOID DISORDERS: ICD-10-CM

## 2024-03-28 DIAGNOSIS — Z13.228 ENCOUNTER FOR SCREENING FOR OTHER SUSPECTED ENDOCRINE DISORDER: ICD-10-CM

## 2024-03-28 DIAGNOSIS — Z13.0 ENCOUNTER FOR SCREENING FOR DISEASES OF THE BLOOD AND BLOOD-FORMING ORGANS AND CERTAIN DISORDERS INVOLVING THE IMMUNE MECHANISM: ICD-10-CM

## 2024-03-28 PROCEDURE — 99396 PREV VISIT EST AGE 40-64: CPT

## 2024-03-28 PROCEDURE — 99213 OFFICE O/P EST LOW 20 MIN: CPT

## 2024-03-28 RX ORDER — ELECTROLYTES/DEXTROSE
SOLUTION, ORAL ORAL
Refills: 0 | Status: ACTIVE | COMMUNITY

## 2024-03-28 RX ORDER — GLUCOSAMN/CONDROITN/C/MN/BORON 750-600-30
TABLET ORAL
Refills: 0 | Status: ACTIVE | COMMUNITY

## 2024-03-28 NOTE — HISTORY OF PRESENT ILLNESS
[de-identified] : Pt for CPE Pt sees Dr. Ceja (cardio) every 6-8 months for nonobstructive CAD, and htn. Pt has occasional PVCs, last stress test 1.5 yrs ago. Pt takes hctz and lisinopril for htn. interested in shingrix, will check insurance first if covered Pt c/o urinary hesitancy, nocturia 1-2x a night. Sees uro Dr. Sandoval for OAB. Pt now supplementing saw palmetto Pt s/p R shoulder surgery for rotator cuff tear in 09/2023. Pt has DARREN, uses CPAP. Sees Dr. Edwards Pt has hx elevated CK, +MAXIMINO, +sjogrens ab, +anti histone, sees rheum Dr. Magana, neg genetic w/u. Will monitor Pt had colonoscopy 10/2022 single polyp done by GI Dr. Raymundo. Pt also c/o chronic low back pain w/ herniated discs and L hip pain. Pt has had hx of L4/5/S1 epidural approx 2016. Pt low back pain worse when standing or sitting upright for long periods. Hot showers, heating pads, sitting at incline helps.  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

## 2024-03-28 NOTE — HISTORY OF PRESENT ILLNESS
[FreeTextEntry1] : 59 yo M presents for f/u Pt is doing well. He still has urgency sensation but learned to controlled them.  He takes saw palmetto supplement which helps with his urination  He drinks 32 ounces of coffea during AM and takes 12.5 mg hctz at night   PSA 2.29 on 10/2022 pvr 33cc previously

## 2024-03-28 NOTE — ASSESSMENT
[FreeTextEntry1] : Overactive Bladder: previously took oxybutynin but stopped due to cough/dry mouth [place] to control urgency  intermittently / Straining: takes saw palmetto supplement f/u 12 months with PSA

## 2024-03-28 NOTE — PHYSICAL EXAM
[General Appearance - Well Developed] : well developed [General Appearance - Well Nourished] : well nourished [Normal Appearance] : normal appearance [General Appearance - In No Acute Distress] : no acute distress [Well Groomed] : well groomed [] : no respiratory distress [Edema] : no peripheral edema [Respiration, Rhythm And Depth] : normal respiratory rhythm and effort [Exaggerated Use Of Accessory Muscles For Inspiration] : no accessory muscle use [Oriented To Time, Place, And Person] : oriented to person, place, and time [Affect] : the affect was normal [Mood] : the mood was normal [Not Anxious] : not anxious [Normal Station and Gait] : the gait and station were normal for the patient's age [No Focal Deficits] : no focal deficits

## 2024-03-28 NOTE — HEALTH RISK ASSESSMENT
[Patient reported colonoscopy was normal] : Patient reported colonoscopy was normal [Former] : Former [15-19] : 15-19 [> 15 Years] : > 15 Years [0] : 2) Feeling down, depressed, or hopeless: Not at all (0) [PHQ-2 Negative - No further assessment needed] : PHQ-2 Negative - No further assessment needed [MMZ1Xquvd] : 0 [ColonoscopyDate] : 10/22 [ColonoscopyComments] : single polyp, repeat in 3-5 yrs

## 2024-03-29 ENCOUNTER — APPOINTMENT (OUTPATIENT)
Dept: RHEUMATOLOGY | Facility: CLINIC | Age: 59
End: 2024-03-29
Payer: COMMERCIAL

## 2024-03-29 VITALS
SYSTOLIC BLOOD PRESSURE: 131 MMHG | HEART RATE: 75 BPM | HEIGHT: 70 IN | OXYGEN SATURATION: 99 % | DIASTOLIC BLOOD PRESSURE: 87 MMHG | WEIGHT: 235 LBS | BODY MASS INDEX: 33.64 KG/M2

## 2024-03-29 PROCEDURE — 99214 OFFICE O/P EST MOD 30 MIN: CPT

## 2024-04-02 LAB
ALBUMIN SERPL ELPH-MCNC: 4.6 G/DL
ALP BLD-CCNC: 72 U/L
ALT SERPL-CCNC: 31 U/L
ANION GAP SERPL CALC-SCNC: 11 MMOL/L
APPEARANCE: CLEAR
AST SERPL-CCNC: 29 U/L
BACTERIA: NEGATIVE /HPF
BASOPHILS # BLD AUTO: 0.1 K/UL
BASOPHILS NFR BLD AUTO: 1.4 %
BILIRUB SERPL-MCNC: 0.6 MG/DL
BILIRUBIN URINE: NEGATIVE
BLOOD URINE: NEGATIVE
BUN SERPL-MCNC: 11 MG/DL
CALCIUM SERPL-MCNC: 9.6 MG/DL
CAST: 1 /LPF
CHLORIDE SERPL-SCNC: 105 MMOL/L
CHOLEST SERPL-MCNC: 161 MG/DL
CO2 SERPL-SCNC: 26 MMOL/L
COLOR: NORMAL
CREAT SERPL-MCNC: 1.07 MG/DL
EGFR: 80 ML/MIN/1.73M2
ENDOMYSIUM IGA SER QL: NEGATIVE
ENDOMYSIUM IGA TITR SER: NORMAL
EOSINOPHIL # BLD AUTO: 0.36 K/UL
EOSINOPHIL NFR BLD AUTO: 5.2 %
EPITHELIAL CELLS: 0 /HPF
ESTIMATED AVERAGE GLUCOSE: 105 MG/DL
FERRITIN SERPL-MCNC: 113 NG/ML
GLIADIN IGA SER QL: 6.5 UNITS
GLIADIN IGG SER QL: <5 UNITS
GLIADIN PEPTIDE IGA SER-ACNC: NEGATIVE
GLIADIN PEPTIDE IGG SER-ACNC: NEGATIVE
GLUCOSE QUALITATIVE U: NEGATIVE MG/DL
GLUCOSE SERPL-MCNC: 84 MG/DL
HBA1C MFR BLD HPLC: 5.3 %
HCT VFR BLD CALC: 48.9 %
HDLC SERPL-MCNC: 49 MG/DL
HGB BLD-MCNC: 16.6 G/DL
IMM GRANULOCYTES NFR BLD AUTO: 0.3 %
IRON SATN MFR SERPL: 26 %
IRON SERPL-MCNC: 95 UG/DL
KETONES URINE: ABNORMAL MG/DL
LDLC SERPL CALC-MCNC: 100 MG/DL
LEUKOCYTE ESTERASE URINE: NEGATIVE
LYMPHOCYTES # BLD AUTO: 1.72 K/UL
LYMPHOCYTES NFR BLD AUTO: 24.7 %
MAN DIFF?: NORMAL
MCHC RBC-ENTMCNC: 31.1 PG
MCHC RBC-ENTMCNC: 33.9 GM/DL
MCV RBC AUTO: 91.7 FL
MICROSCOPIC-UA: NORMAL
MONOCYTES # BLD AUTO: 0.63 K/UL
MONOCYTES NFR BLD AUTO: 9.1 %
NEUTROPHILS # BLD AUTO: 4.12 K/UL
NEUTROPHILS NFR BLD AUTO: 59.3 %
NITRITE URINE: NEGATIVE
NONHDLC SERPL-MCNC: 112 MG/DL
PH URINE: 6.5
PLATELET # BLD AUTO: 327 K/UL
POTASSIUM SERPL-SCNC: 4.7 MMOL/L
PROT SERPL-MCNC: 7.1 G/DL
PROTEIN URINE: NORMAL MG/DL
RBC # BLD: 5.33 M/UL
RBC # FLD: 12.6 %
RED BLOOD CELLS URINE: 1 /HPF
SODIUM SERPL-SCNC: 142 MMOL/L
SPECIFIC GRAVITY URINE: >1.03
T4 FREE SERPL-MCNC: 1.1 NG/DL
TIBC SERPL-MCNC: 363 UG/DL
TRIGL SERPL-MCNC: 62 MG/DL
TSH SERPL-ACNC: 0.9 UIU/ML
TTG IGA SER IA-ACNC: <1.2 U/ML
TTG IGA SER-ACNC: NEGATIVE
TTG IGG SER IA-ACNC: 3.8 U/ML
TTG IGG SER IA-ACNC: NEGATIVE
UIBC SERPL-MCNC: 269 UG/DL
UROBILINOGEN URINE: 1 MG/DL
WBC # FLD AUTO: 6.95 K/UL
WHITE BLOOD CELLS URINE: 0 /HPF

## 2024-04-02 RX ORDER — ALBUTEROL SULFATE 90 UG/1
108 (90 BASE) AEROSOL, METERED RESPIRATORY (INHALATION)
Qty: 1 | Refills: 5 | Status: ACTIVE | COMMUNITY
Start: 2017-10-27 | End: 1900-01-01

## 2024-06-06 ENCOUNTER — APPOINTMENT (OUTPATIENT)
Dept: PULMONOLOGY | Facility: CLINIC | Age: 59
End: 2024-06-06
Payer: COMMERCIAL

## 2024-06-06 VITALS
OXYGEN SATURATION: 98 % | DIASTOLIC BLOOD PRESSURE: 74 MMHG | WEIGHT: 231 LBS | HEART RATE: 74 BPM | BODY MASS INDEX: 34.61 KG/M2 | HEIGHT: 68.5 IN | RESPIRATION RATE: 16 BRPM | SYSTOLIC BLOOD PRESSURE: 126 MMHG

## 2024-06-06 DIAGNOSIS — Z87.891 PERSONAL HISTORY OF NICOTINE DEPENDENCE: ICD-10-CM

## 2024-06-06 DIAGNOSIS — G47.33 OBSTRUCTIVE SLEEP APNEA (ADULT) (PEDIATRIC): ICD-10-CM

## 2024-06-06 DIAGNOSIS — E66.9 OBESITY, UNSPECIFIED: ICD-10-CM

## 2024-06-06 PROCEDURE — G2211 COMPLEX E/M VISIT ADD ON: CPT | Mod: NC

## 2024-06-06 PROCEDURE — 99214 OFFICE O/P EST MOD 30 MIN: CPT

## 2024-06-06 NOTE — HISTORY OF PRESENT ILLNESS
[Former] : former [Obstructive Sleep Apnea] : obstructive sleep apnea [APAP:] : APAP [Nasal mask] : nasal mask [TextBox_4] : Hx DARREN.  Was seeing Dr Alvarez.  Hx allergies. Sees an allergy specialist. No AI.  On APAP 6-16 with nasal mask. Compliance excellent. Therapeutic AHI WNL.   Since on CPAP, less EDS.   Gets up 4:30 am workdays, by 6:00 am on days off.  Works HVAC.   Quit smoking around 2004.  [YearQuit] : 2004 [TextBox_100] : 11/23 [TextBox_108] : 17 [TextBox_112] : 99 [TextBox_116] : 81 [TextBox_125] : 6-16 [TextBox_137] : 87 [TextBox_147] : 0.4 [TextBox_158] : [employer]

## 2024-11-27 ENCOUNTER — RX ONLY (RX ONLY)
Age: 59
End: 2024-11-27

## 2024-11-27 ENCOUNTER — OFFICE (OUTPATIENT)
Dept: URBAN - METROPOLITAN AREA CLINIC 100 | Facility: CLINIC | Age: 59
Setting detail: OPHTHALMOLOGY
End: 2024-11-27
Payer: COMMERCIAL

## 2024-11-27 DIAGNOSIS — B30.9: ICD-10-CM

## 2024-11-27 PROCEDURE — 99213 OFFICE O/P EST LOW 20 MIN: CPT | Performed by: OPHTHALMOLOGY

## 2024-11-27 ASSESSMENT — REFRACTION_AUTOREFRACTION
OD_AXIS: 075
OD_CYLINDER: -0.25
OS_SPHERE: -3.25
OS_CYLINDER: -1.50
OS_AXIS: 108
OD_SPHERE: -3.00

## 2024-11-27 ASSESSMENT — REFRACTION_MANIFEST
OS_AXIS: 105
OS_CYLINDER: -1.50
OS_SPHERE: -3.50
OS_VA1: 20/20
OD_SPHERE: -3.00
OD_CYLINDER: -1.00
OS_ADD: +2.00
OD_VA1: 20/20
OD_ADD: +2.00
OD_AXIS: 070

## 2024-11-27 ASSESSMENT — KERATOMETRY
OD_AXISANGLE_DEGREES: 090
OS_K1POWER_DIOPTERS: 43.00
OD_K2POWER_DIOPTERS: 43.00
OD_K1POWER_DIOPTERS: 43.00
OS_AXISANGLE_DEGREES: 011
METHOD_AUTO_MANUAL: AUTO
OS_K2POWER_DIOPTERS: 44.00

## 2024-11-27 ASSESSMENT — REFRACTION_CURRENTRX
OD_CYLINDER: -1.00
OD_ADD: +2.00
OS_OVR_VA: 20/
OD_VPRISM_DIRECTION: PROGS
OS_CYLINDER: -1.25
OS_AXIS: 110
OS_VPRISM_DIRECTION: PROGS
OD_OVR_VA: 20/
OS_SPHERE: -4.25
OS_ADD: +2.00
OD_AXIS: 060
OD_SPHERE: -3.50

## 2024-11-27 ASSESSMENT — LID EXAM ASSESSMENTS
OS_BLEPHARITIS: LLL LUL 1+
OD_BLEPHARITIS: RLL RUL 1+

## 2024-11-27 ASSESSMENT — VISUAL ACUITY
OD_BCVA: 20/20-
OS_BCVA: 20/30-2

## 2024-11-27 ASSESSMENT — CONFRONTATIONAL VISUAL FIELD TEST (CVF)
OS_FINDINGS: FULL
OD_FINDINGS: FULL

## 2024-11-27 ASSESSMENT — LID POSITION - PTOSIS: OD_PTOSIS: RUL 1+

## 2024-11-27 ASSESSMENT — TONOMETRY: OD_IOP_MMHG: 16

## 2024-12-09 ENCOUNTER — OFFICE (OUTPATIENT)
Dept: URBAN - METROPOLITAN AREA CLINIC 12 | Facility: CLINIC | Age: 59
Setting detail: OPHTHALMOLOGY
End: 2024-12-09
Payer: COMMERCIAL

## 2024-12-09 DIAGNOSIS — H43.393: ICD-10-CM

## 2024-12-09 DIAGNOSIS — H01.001: ICD-10-CM

## 2024-12-09 DIAGNOSIS — H01.004: ICD-10-CM

## 2024-12-09 DIAGNOSIS — H57.051: ICD-10-CM

## 2024-12-09 DIAGNOSIS — H16.223: ICD-10-CM

## 2024-12-09 DIAGNOSIS — H43.813: ICD-10-CM

## 2024-12-09 DIAGNOSIS — H25.13: ICD-10-CM

## 2024-12-09 DIAGNOSIS — H40.013: ICD-10-CM

## 2024-12-09 DIAGNOSIS — H35.033: ICD-10-CM

## 2024-12-09 PROCEDURE — 92133 CPTRZD OPH DX IMG PST SGM ON: CPT | Performed by: OPHTHALMOLOGY

## 2024-12-09 PROCEDURE — 92083 EXTENDED VISUAL FIELD XM: CPT | Performed by: OPHTHALMOLOGY

## 2024-12-09 PROCEDURE — 92014 COMPRE OPH EXAM EST PT 1/>: CPT | Performed by: OPHTHALMOLOGY

## 2024-12-09 PROCEDURE — 76514 ECHO EXAM OF EYE THICKNESS: CPT | Performed by: OPHTHALMOLOGY

## 2024-12-09 ASSESSMENT — REFRACTION_MANIFEST
OS_CYLINDER: -1.50
OS_VA1: 20/20
OS_AXIS: 105
OD_SPHERE: -3.00
OD_VA1: 20/20
OS_CYLINDER: -1.25
OS_SPHERE: -3.25
OD_ADD: +2.00
OS_SPHERE: -3.50
OD_CYLINDER: -0.75
OD_CYLINDER: -1.00
OS_ADD: +2.00
OS_AXIS: 110
OD_SPHERE: -3.00
OD_AXIS: 070
OD_AXIS: 74

## 2024-12-09 ASSESSMENT — REFRACTION_CURRENTRX
OS_VPRISM_DIRECTION: PROGS
OD_OVR_VA: 20/
OD_AXIS: 71
OS_AXIS: 106
OS_OVR_VA: 20/
OD_SPHERE: -3.00
OS_SPHERE: -3.50
OS_ADD: +2.00
OD_CYLINDER: -1.25
OD_VPRISM_DIRECTION: PROGS
OD_ADD: +2.00
OS_CYLINDER: -1.50

## 2024-12-09 ASSESSMENT — CONFRONTATIONAL VISUAL FIELD TEST (CVF)
OD_FINDINGS: FULL
OS_FINDINGS: FULL

## 2024-12-09 ASSESSMENT — KERATOMETRY
OD_K1POWER_DIOPTERS: 42.75
OD_K2POWER_DIOPTERS: 43.50
OS_K2POWER_DIOPTERS: 44.00
OS_K1POWER_DIOPTERS: 43.25
OD_AXISANGLE_DEGREES: 161
METHOD_AUTO_MANUAL: AUTO
OS_AXISANGLE_DEGREES: 19

## 2024-12-09 ASSESSMENT — PACHYMETRY
OS_CT_UM: 513
OD_CT_CORRECTION: 2
OD_CT_UM: 511
OS_CT_CORRECTION: 2

## 2024-12-09 ASSESSMENT — REFRACTION_AUTOREFRACTION
OD_SPHERE: -3.00
OS_AXIS: 110
OD_CYLINDER: -0.75
OD_AXIS: 74
OS_SPHERE: -3.25
OS_CYLINDER: -1.25

## 2024-12-09 ASSESSMENT — TONOMETRY
OS_IOP_MMHG: 19
OD_IOP_MMHG: 18

## 2024-12-09 ASSESSMENT — LID EXAM ASSESSMENTS
OS_BLEPHARITIS: LLL LUL 1+
OD_BLEPHARITIS: RLL RUL 1+

## 2024-12-09 ASSESSMENT — LID POSITION - PTOSIS: OD_PTOSIS: RUL 1+

## 2024-12-09 ASSESSMENT — VISUAL ACUITY
OS_BCVA: 20/25+2
OD_BCVA: 20/20-1

## 2025-03-25 LAB
BASOPHILS # BLD AUTO: 0.07 K/UL
BASOPHILS NFR BLD AUTO: 1.2 %
EOSINOPHIL # BLD AUTO: 0.29 K/UL
EOSINOPHIL NFR BLD AUTO: 5.1 %
ERYTHROCYTE [SEDIMENTATION RATE] IN BLOOD BY WESTERGREN METHOD: 4 MM/HR
HCT VFR BLD CALC: 48 %
HGB BLD-MCNC: 16.6 G/DL
IMM GRANULOCYTES NFR BLD AUTO: 0.2 %
LYMPHOCYTES # BLD AUTO: 1.57 K/UL
LYMPHOCYTES NFR BLD AUTO: 27.4 %
MAN DIFF?: NORMAL
MCHC RBC-ENTMCNC: 31.7 PG
MCHC RBC-ENTMCNC: 34.6 G/DL
MCV RBC AUTO: 91.6 FL
MONOCYTES # BLD AUTO: 0.62 K/UL
MONOCYTES NFR BLD AUTO: 10.8 %
NEUTROPHILS # BLD AUTO: 3.16 K/UL
NEUTROPHILS NFR BLD AUTO: 55.3 %
PLATELET # BLD AUTO: 344 K/UL
RBC # BLD: 5.24 M/UL
RBC # FLD: 13 %
WBC # FLD AUTO: 5.72 K/UL

## 2025-03-26 LAB
25(OH)D3 SERPL-MCNC: 20.4 NG/ML
ALBUMIN SERPL ELPH-MCNC: 4.6 G/DL
ALP BLD-CCNC: 68 U/L
ALT SERPL-CCNC: 34 U/L
ANION GAP SERPL CALC-SCNC: 12 MMOL/L
APPEARANCE: CLEAR
AST SERPL-CCNC: 30 U/L
BILIRUB SERPL-MCNC: 0.8 MG/DL
BILIRUBIN URINE: NEGATIVE
BLOOD URINE: NEGATIVE
BUN SERPL-MCNC: 13 MG/DL
C3 SERPL-MCNC: 108 MG/DL
C4 SERPL-MCNC: 28 MG/DL
CALCIUM SERPL-MCNC: 9.7 MG/DL
CCP AB SER IA-ACNC: 95.4 U/ML
CHLORIDE SERPL-SCNC: 101 MMOL/L
CHOLEST SERPL-MCNC: 162 MG/DL
CK SERPL-CCNC: 461 U/L
CO2 SERPL-SCNC: 26 MMOL/L
COLOR: YELLOW
CREAT SERPL-MCNC: 1.01 MG/DL
CRP SERPL-MCNC: <3 MG/L
DSDNA AB SER-ACNC: 1 IU/ML
EGFRCR SERPLBLD CKD-EPI 2021: 86 ML/MIN/1.73M2
ENA RNP AB SER IA-ACNC: 0.2 AL
ENA SM AB SER IA-ACNC: <0.2 AL
ENA SS-A AB SER IA-ACNC: 3.6 AL
ENA SS-B AB SER IA-ACNC: <0.2 AL
ESTIMATED AVERAGE GLUCOSE: 108 MG/DL
GLUCOSE QUALITATIVE U: NEGATIVE MG/DL
GLUCOSE SERPL-MCNC: 105 MG/DL
HBA1C MFR BLD HPLC: 5.4 %
HDLC SERPL-MCNC: 44 MG/DL
KETONES URINE: NEGATIVE MG/DL
LDLC SERPL-MCNC: 106 MG/DL
LEUKOCYTE ESTERASE URINE: NEGATIVE
MYELOPEROXIDASE AB SER QL IA: <0.2 AL
MYELOPEROXIDASE CELLS FLD QL: NEGATIVE
NITRITE URINE: NEGATIVE
NONHDLC SERPL-MCNC: 118 MG/DL
PH URINE: 6
POTASSIUM SERPL-SCNC: 4.4 MMOL/L
PROT SERPL-MCNC: 7.1 G/DL
PROTEIN URINE: NEGATIVE MG/DL
PROTEINASE3 AB SER IA-ACNC: <0.2 AL
PROTEINASE3 AB SER-ACNC: NEGATIVE
PSA FREE SERPL-MCNC: 0.65 NG/ML
RF+CCP IGG SER-IMP: POSITIVE
RHEUMATOID FACT SER QL: <10 IU/ML
SODIUM SERPL-SCNC: 139 MMOL/L
SPECIFIC GRAVITY URINE: 1.02
THYROGLOB AB SERPL-ACNC: <15 IU/ML
THYROPEROXIDASE AB SERPL IA-ACNC: 12.2 IU/ML
TRIGL SERPL-MCNC: 58 MG/DL
TSH SERPL-ACNC: 1.48 UIU/ML
UROBILINOGEN URINE: 0.2 MG/DL

## 2025-03-27 LAB
ANA PAT FLD IF-IMP: ABNORMAL
ANA SER IF-ACNC: ABNORMAL

## 2025-03-28 ENCOUNTER — LABORATORY RESULT (OUTPATIENT)
Age: 60
End: 2025-03-28

## 2025-03-28 ENCOUNTER — APPOINTMENT (OUTPATIENT)
Dept: UROLOGY | Facility: CLINIC | Age: 60
End: 2025-03-28
Payer: COMMERCIAL

## 2025-03-28 VITALS
BODY MASS INDEX: 34.61 KG/M2 | WEIGHT: 231 LBS | HEART RATE: 76 BPM | DIASTOLIC BLOOD PRESSURE: 82 MMHG | SYSTOLIC BLOOD PRESSURE: 119 MMHG

## 2025-03-28 DIAGNOSIS — R35.1 BENIGN PROSTATIC HYPERPLASIA WITH LOWER URINARY TRACT SYMPMS: ICD-10-CM

## 2025-03-28 DIAGNOSIS — N40.1 BENIGN PROSTATIC HYPERPLASIA WITH LOWER URINARY TRACT SYMPMS: ICD-10-CM

## 2025-03-28 LAB — HISTONE AB SER QL: 1.8 UNITS

## 2025-03-28 PROCEDURE — 99213 OFFICE O/P EST LOW 20 MIN: CPT

## 2025-03-28 PROCEDURE — 51798 US URINE CAPACITY MEASURE: CPT

## 2025-03-29 LAB
ALBUMIN MFR SERPL ELPH: 63.4 %
ALBUMIN SERPL-MCNC: 4.4 G/DL
ALBUMIN/GLOB SERPL: 1.8 RATIO
ALPHA1 GLOB MFR SERPL ELPH: 4 %
ALPHA1 GLOB SERPL ELPH-MCNC: 0.3 G/DL
ALPHA2 GLOB MFR SERPL ELPH: 8.7 %
ALPHA2 GLOB SERPL ELPH-MCNC: 0.6 G/DL
B-GLOBULIN MFR SERPL ELPH: 10.4 %
B-GLOBULIN SERPL ELPH-MCNC: 0.7 G/DL
GAMMA GLOB FLD ELPH-MCNC: 0.9 G/DL
GAMMA GLOB MFR SERPL ELPH: 13.5 %
INTERPRETATION SERPL IEP-IMP: NORMAL
M PROTEIN SPEC IFE-MCNC: NORMAL
PROT SERPL-MCNC: 6.9 G/DL
PROT SERPL-MCNC: 6.9 G/DL

## 2025-04-02 ENCOUNTER — NON-APPOINTMENT (OUTPATIENT)
Age: 60
End: 2025-04-02

## 2025-04-02 ENCOUNTER — APPOINTMENT (OUTPATIENT)
Dept: RHEUMATOLOGY | Facility: CLINIC | Age: 60
End: 2025-04-02
Payer: COMMERCIAL

## 2025-04-02 VITALS
BODY MASS INDEX: 34.61 KG/M2 | OXYGEN SATURATION: 98 % | HEART RATE: 58 BPM | WEIGHT: 231 LBS | DIASTOLIC BLOOD PRESSURE: 82 MMHG | SYSTOLIC BLOOD PRESSURE: 120 MMHG

## 2025-04-02 DIAGNOSIS — M25.50 PAIN IN UNSPECIFIED JOINT: ICD-10-CM

## 2025-04-02 PROCEDURE — 99214 OFFICE O/P EST MOD 30 MIN: CPT

## 2025-04-26 ENCOUNTER — APPOINTMENT (OUTPATIENT)
Dept: FAMILY MEDICINE | Facility: CLINIC | Age: 60
End: 2025-04-26
Payer: COMMERCIAL

## 2025-04-26 VITALS
WEIGHT: 235 LBS | OXYGEN SATURATION: 98 % | SYSTOLIC BLOOD PRESSURE: 124 MMHG | TEMPERATURE: 97.2 F | BODY MASS INDEX: 35.21 KG/M2 | DIASTOLIC BLOOD PRESSURE: 82 MMHG | HEIGHT: 68.5 IN | HEART RATE: 74 BPM

## 2025-04-26 DIAGNOSIS — I10 ESSENTIAL (PRIMARY) HYPERTENSION: ICD-10-CM

## 2025-04-26 PROCEDURE — 99213 OFFICE O/P EST LOW 20 MIN: CPT
